# Patient Record
Sex: FEMALE | Race: WHITE | NOT HISPANIC OR LATINO | Employment: OTHER | URBAN - METROPOLITAN AREA
[De-identification: names, ages, dates, MRNs, and addresses within clinical notes are randomized per-mention and may not be internally consistent; named-entity substitution may affect disease eponyms.]

---

## 2023-02-05 PROBLEM — E55.9 VITAMIN D DEFICIENCY: Status: ACTIVE | Noted: 2023-02-05

## 2023-02-05 PROBLEM — I25.10 CORONARY ARTERY DISEASE INVOLVING NATIVE CORONARY ARTERY OF NATIVE HEART WITHOUT ANGINA PECTORIS: Status: ACTIVE | Noted: 2023-02-05

## 2023-02-05 PROBLEM — M19.90 ARTHRITIS: Status: ACTIVE | Noted: 2023-02-05

## 2023-02-05 PROBLEM — I65.29 STENOSIS OF CAROTID ARTERY: Status: ACTIVE | Noted: 2023-02-05

## 2023-02-05 PROBLEM — I10 HYPERTENSION, ESSENTIAL: Status: ACTIVE | Noted: 2023-02-05

## 2023-02-05 PROBLEM — E53.8 VITAMIN B12 DEFICIENCY: Status: ACTIVE | Noted: 2023-02-05

## 2023-02-05 PROBLEM — E78.2 HYPERLIPIDEMIA, MIXED: Status: ACTIVE | Noted: 2023-02-05

## 2023-02-05 PROBLEM — I51.9 DIASTOLIC DYSFUNCTION, LEFT VENTRICLE: Status: ACTIVE | Noted: 2023-02-05

## 2023-02-05 PROBLEM — R61 HYPERHIDROSIS: Status: ACTIVE | Noted: 2023-02-05

## 2023-02-05 PROBLEM — N20.0 KIDNEY STONES: Status: ACTIVE | Noted: 2023-02-05

## 2023-02-05 PROBLEM — M81.0 AGE-RELATED OSTEOPOROSIS WITHOUT CURRENT PATHOLOGICAL FRACTURE: Status: ACTIVE | Noted: 2023-02-05

## 2023-02-05 PROBLEM — F51.04 CHRONIC INSOMNIA: Status: ACTIVE | Noted: 2023-02-05

## 2023-02-05 PROBLEM — I77.819 DILATION OF AORTA (CMS-HCC): Status: ACTIVE | Noted: 2023-02-05

## 2023-02-05 PROBLEM — M50.90 CERVICAL DISC DISORDER: Status: ACTIVE | Noted: 2023-02-05

## 2023-02-05 PROBLEM — I51.7 LVH (LEFT VENTRICULAR HYPERTROPHY): Status: ACTIVE | Noted: 2023-02-05

## 2023-02-05 PROBLEM — F11.90 OPIOID USE: Status: ACTIVE | Noted: 2023-02-05

## 2023-02-05 RX ORDER — DENOSUMAB 60 MG/ML
60 INJECTION SUBCUTANEOUS
COMMUNITY
Start: 2018-08-17

## 2023-02-05 RX ORDER — ASPIRIN 325 MG
50000 TABLET, DELAYED RELEASE (ENTERIC COATED) ORAL
COMMUNITY
Start: 2021-07-15 | End: 2023-10-23

## 2023-02-05 RX ORDER — ZOLPIDEM TARTRATE 5 MG/1
5 TABLET ORAL NIGHTLY PRN
COMMUNITY
Start: 2018-08-17 | End: 2023-03-08 | Stop reason: SDUPTHER

## 2023-02-05 RX ORDER — HYDROCODONE BITARTRATE AND ACETAMINOPHEN 5; 325 MG/1; MG/1
1 TABLET ORAL EVERY 8 HOURS PRN
COMMUNITY
Start: 2018-08-17 | End: 2023-03-08 | Stop reason: SDUPTHER

## 2023-02-05 RX ORDER — ASPIRIN 325 MG
500 TABLET, DELAYED RELEASE (ENTERIC COATED) ORAL DAILY
COMMUNITY
Start: 2018-08-17

## 2023-02-05 RX ORDER — LANOLIN ALCOHOL/MO/W.PET/CERES
1000 CREAM (GRAM) TOPICAL DAILY
COMMUNITY
Start: 2019-10-31

## 2023-02-05 RX ORDER — AMLODIPINE AND BENAZEPRIL HYDROCHLORIDE 5; 40 MG/1; MG/1
1 CAPSULE ORAL DAILY
COMMUNITY
Start: 2018-08-17 | End: 2024-02-14

## 2023-02-05 RX ORDER — ASPIRIN 81 MG/1
81 TABLET ORAL DAILY
COMMUNITY
Start: 2018-08-17

## 2023-02-05 RX ORDER — ASCORBIC ACID 500 MG
500 TABLET,CHEWABLE ORAL 2 TIMES DAILY
COMMUNITY
Start: 2018-08-17

## 2023-02-05 RX ORDER — NALOXONE HYDROCHLORIDE 4 MG/.1ML
4 SPRAY NASAL AS NEEDED
COMMUNITY
Start: 2019-12-27

## 2023-02-05 RX ORDER — PHENOL 1.4 %
1 AEROSOL, SPRAY (ML) MUCOUS MEMBRANE DAILY
COMMUNITY
Start: 2018-08-17

## 2023-02-05 RX ORDER — ATORVASTATIN CALCIUM 80 MG/1
80 TABLET, FILM COATED ORAL DAILY
COMMUNITY
Start: 2018-11-15 | End: 2023-06-23 | Stop reason: SDUPTHER

## 2023-02-05 RX ORDER — NITROGLYCERIN 0.4 MG/1
0.4 TABLET SUBLINGUAL EVERY 5 MIN PRN
COMMUNITY
Start: 2022-05-26

## 2023-03-07 NOTE — TELEPHONE ENCOUNTER
Pt called for refill hydrocodone   Per allscripts we sent this in on 2/28/23    Relayed to patient   Verbally understands.   She will call pharmacy

## 2023-03-08 DIAGNOSIS — F51.04 CHRONIC INSOMNIA: ICD-10-CM

## 2023-03-08 DIAGNOSIS — M19.90 ARTHRITIS: ICD-10-CM

## 2023-03-08 RX ORDER — HYDROCODONE BITARTRATE AND ACETAMINOPHEN 5; 325 MG/1; MG/1
1 TABLET ORAL EVERY 8 HOURS PRN
Qty: 20 TABLET | Refills: 0 | Status: SHIPPED | OUTPATIENT
Start: 2023-03-08 | End: 2023-03-20 | Stop reason: SDUPTHER

## 2023-03-08 RX ORDER — ZOLPIDEM TARTRATE 5 MG/1
5 TABLET ORAL NIGHTLY PRN
Qty: 90 TABLET | Refills: 0 | Status: SHIPPED | OUTPATIENT
Start: 2023-03-08 | End: 2023-06-06 | Stop reason: SDUPTHER

## 2023-03-08 NOTE — TELEPHONE ENCOUNTER
Patient calling needs zolpidem to go to [facility] and hydrocodone to go to [facility] as [facility] is out of med that was sent on 2/28  Formatted both

## 2023-03-15 ENCOUNTER — APPOINTMENT (OUTPATIENT)
Dept: PRIMARY CARE | Facility: CLINIC | Age: 78
End: 2023-03-15
Payer: MEDICARE

## 2023-03-20 DIAGNOSIS — M19.90 ARTHRITIS: ICD-10-CM

## 2023-03-20 RX ORDER — HYDROCODONE BITARTRATE AND ACETAMINOPHEN 5; 325 MG/1; MG/1
1 TABLET ORAL EVERY 8 HOURS PRN
Qty: 20 TABLET | Refills: 0 | Status: SHIPPED | OUTPATIENT
Start: 2023-03-20 | End: 2023-04-07 | Stop reason: SDUPTHER

## 2023-04-03 DIAGNOSIS — M19.90 ARTHRITIS: ICD-10-CM

## 2023-04-03 RX ORDER — HYDROCODONE BITARTRATE AND ACETAMINOPHEN 5; 325 MG/1; MG/1
1 TABLET ORAL EVERY 8 HOURS PRN
Qty: 20 TABLET | Refills: 0 | Status: CANCELLED | OUTPATIENT
Start: 2023-04-03

## 2023-04-03 NOTE — TELEPHONE ENCOUNTER
Pt needs a CSV per SF  Pt cancelled last one so her medication can not be refilled  Please help schedule VV per SF  [phone]

## 2023-04-03 NOTE — TELEPHONE ENCOUNTER
Patient was in the hospital and needs help with VV's so has to wait until Friday to get help.  Appt scheduled.

## 2023-04-07 ENCOUNTER — OFFICE VISIT (OUTPATIENT)
Dept: PRIMARY CARE | Facility: CLINIC | Age: 78
End: 2023-04-07
Payer: MEDICARE

## 2023-04-07 VITALS
WEIGHT: 142.6 LBS | HEART RATE: 83 BPM | DIASTOLIC BLOOD PRESSURE: 64 MMHG | BODY MASS INDEX: 25.26 KG/M2 | OXYGEN SATURATION: 96 % | SYSTOLIC BLOOD PRESSURE: 136 MMHG

## 2023-04-07 DIAGNOSIS — F51.04 CHRONIC INSOMNIA: ICD-10-CM

## 2023-04-07 DIAGNOSIS — M19.90 ARTHRITIS: Primary | ICD-10-CM

## 2023-04-07 DIAGNOSIS — H61.22 IMPACTED CERUMEN, LEFT EAR: ICD-10-CM

## 2023-04-07 DIAGNOSIS — Z79.899 MEDICATION MANAGEMENT: ICD-10-CM

## 2023-04-07 DIAGNOSIS — M50.90 CERVICAL DISC DISORDER: ICD-10-CM

## 2023-04-07 DIAGNOSIS — F11.90 OPIOID USE: ICD-10-CM

## 2023-04-07 DIAGNOSIS — R09.82 POST-NASAL DRIP: ICD-10-CM

## 2023-04-07 PROCEDURE — 3078F DIAST BP <80 MM HG: CPT | Performed by: NURSE PRACTITIONER

## 2023-04-07 PROCEDURE — 1159F MED LIST DOCD IN RCRD: CPT | Performed by: NURSE PRACTITIONER

## 2023-04-07 PROCEDURE — 3075F SYST BP GE 130 - 139MM HG: CPT | Performed by: NURSE PRACTITIONER

## 2023-04-07 PROCEDURE — 1036F TOBACCO NON-USER: CPT | Performed by: NURSE PRACTITIONER

## 2023-04-07 PROCEDURE — 99214 OFFICE O/P EST MOD 30 MIN: CPT | Performed by: NURSE PRACTITIONER

## 2023-04-07 RX ORDER — FLUTICASONE PROPIONATE 50 MCG
1 SPRAY, SUSPENSION (ML) NASAL 2 TIMES DAILY
Qty: 16 G | Refills: 5 | Status: SHIPPED | OUTPATIENT
Start: 2023-04-07 | End: 2024-04-06

## 2023-04-07 RX ORDER — HYDROCODONE BITARTRATE AND ACETAMINOPHEN 5; 325 MG/1; MG/1
1 TABLET ORAL EVERY 8 HOURS PRN
Qty: 20 TABLET | Refills: 0 | Status: SHIPPED | OUTPATIENT
Start: 2023-04-07 | End: 2023-04-18 | Stop reason: SDUPTHER

## 2023-04-07 NOTE — PROGRESS NOTES
Problem List Items Addressed This Visit       Arthritis - Primary     Has cut down on opioid use  Cont current tx plan         Relevant Medications    HYDROcodone-acetaminophen (Norco) 5-325 mg tablet    Cervical disc disorder     Cont current tx plan          Chronic insomnia     Cont current dose ambien prn          Medication management     CSA, UDS, OARRS, CSV [facility] compliant            Opioid use     Narcan available           Other Visit Diagnoses       Impacted cerumen, left ear        try OTC debrox with prn fu    Relevant Medications    carbamide peroxide (Debrox) 6.5 % otic solution    Post-nasal drip        try OTC flonase    Relevant Medications    fluticasone (Flonase) 50 mcg/actuation nasal spray            Controlled Substance Visit:  I have personally reviewed the OARRS report and have considered the risks of abuse, dependence, addiction and diversion and I believe that it is clinically appropriate for the patient to be prescribed this medication.    Is the patient prescribed a combination of a benzodiazepine and opioid?  No    Last Urine Drug Screen / ordered today: No  Recent Results (from the past 21059 hour(s))   OPIATE/OPIOID/BENZO PRESCRIPTION COMPLIANCE    Collection Time: 01/04/23  8:53 AM   Result Value Ref Range    DRUG SCREEN COMMENT URINE SEE BELOW     Creatine, Urine 75.0 mg/dL    Amphetamine Screen, Urine PRESUMPTIVE NEGATIVE NEGATIVE    Barbiturate Screen, Urine PRESUMPTIVE NEGATIVE NEGATIVE    Cannabinoid Screen, Urine PRESUMPTIVE NEGATIVE NEGATIVE    Cocaine Screen, Urine PRESUMPTIVE NEGATIVE NEGATIVE    PCP Screen, Urine PRESUMPTIVE NEGATIVE NEGATIVE    7-Aminoclonazepam <25 Cutoff <25 ng/mL    Alpha-Hydroxyalprazolam <25 Cutoff <25 ng/mL    Alpha-Hydroxymidazolam <25 Cutoff <25 ng/mL    Alprazolam <25 Cutoff <25 ng/mL    Chlordiazepoxide <25 Cutoff <25 ng/mL    Clonazepam <25 Cutoff <25 ng/mL    Diazepam <25 Cutoff <25 ng/mL    Lorazepam <25 Cutoff <25 ng/mL    Midazolam <25 Cutoff  <25 ng/mL    Nordiazepam <25 Cutoff <25 ng/mL    Oxazepam <25 Cutoff <25 ng/mL    Temazepam <25 Cutoff <25 ng/mL    Zolpidem <25 Cutoff <25 ng/mL    Zolpidem Metabolite (ZCA) >1000 (A) Cutoff <25 ng/mL    6-Acetylmorphine <25 Cutoff <25 ng/mL    Codeine <50 Cutoff <50 ng/mL    Hydrocodone 121 (A) Cutoff <25 ng/mL    Hydromorphone 126 (A) Cutoff <25 ng/mL    Morphine Urine <50 Cutoff <50 ng/mL    Norhydrocodone 501 (A) Cutoff <25 ng/mL    Noroxycodone <25 Cutoff <25 ng/mL    Oxycodone <25 Cutoff <25 ng/mL    Oxymorphone <25 Cutoff <25 ng/mL    Tramadol <50 Cutoff <50 ng/mL    O-Desmethyltramadol <50 Cutoff <50 ng/mL    Fentanyl <2.5 Cutoff<2.5 ng/mL    Norfentanyl <2.5 Cutoff<2.5 ng/mL    METHADONE CONFIRMATION,URINE <25 Cutoff <25 ng/mL    EDDP <25 Cutoff <25 ng/mL     Results are as expected.     Controlled Substance Agreement:  Date of the Last Agreement: 1/4/23  I have reviewed each line item on the Controlled Substance Agreement including, but not limited to, the benefits, risks, and alternatives to treatment with a Controlled Substance medication(s). The patient has verbalized understanding and signed the agreement.    Opioids:  What is the patient's goal of therapy? pain  Is this being achieved with current treatment? yes    I have calculated the patient's Morphine Dose Equivalent (MED):   I have considered referral to Pain Management and/or a specialist, and do not feel it is necessary at this time.    I feel that it is clinically indicated to continue this current medication regimen after consideration of alternative therapies, and other non-opioid treatment.    Opioid Risk Screening:  No data recorded    Pain Assessment:  Analgesia  What was your pain level on average during the past week?: 8  What was your pain level at its worst during the past week?: 10 - Pain as bad as it can be  What percentage of your pain has been relieved during the past week?: 50 %  Is the amount of pain relief you are now  obtaining from your current pain relievers enough to make a real difference in your life?: Y    Activities of Daily Living  Physical Functioning: Same  Family Relationships: Same  Social Relationships: Same  Mood: Same  Sleep Patterns: Same  Overall Functioning: Same    Adverse Events  Is patient experiencing any side effects from current pain relievers?: N     and Sleep Aids:   What is the patient's goal of therapy? insomnia  Is this being achieved with current treatment? yes    Activities of Daily Living:   Is your overall impression that this patient is benefiting (symptom reduction outweighs side effects) from sleep aid therapy? Yes     1. Physical Functioning: Same  2. Family Relationship: Same  3. Social Relationship: Same  4. Mood: Same  5. Sleep Patterns: Same  6. Overall Function: Same      Gen: Alert, NAD  HEENT:  PERRLA, EOMI, conjunctiva and sclera normal in appearance; Neck supple  Respiratory:  Lungs CTAB  Cardiovascular:  Heart RRR. No M/R/G  Abdomen: soft  Neuro:  Gross motor and sensory intact  Skin:  No suspicious lesions present   Mood: normal

## 2023-04-18 DIAGNOSIS — M19.90 ARTHRITIS: ICD-10-CM

## 2023-04-19 RX ORDER — HYDROCODONE BITARTRATE AND ACETAMINOPHEN 5; 325 MG/1; MG/1
1 TABLET ORAL EVERY 8 HOURS PRN
Qty: 90 TABLET | Refills: 0 | Status: SHIPPED | OUTPATIENT
Start: 2023-04-19 | End: 2023-05-18 | Stop reason: SDUPTHER

## 2023-04-20 DIAGNOSIS — M19.90 ARTHRITIS: ICD-10-CM

## 2023-04-20 RX ORDER — HYDROCODONE BITARTRATE AND ACETAMINOPHEN 5; 325 MG/1; MG/1
1 TABLET ORAL EVERY 8 HOURS PRN
Qty: 90 TABLET | Refills: 0 | Status: CANCELLED | OUTPATIENT
Start: 2023-04-20 | End: 2023-05-20

## 2023-05-02 DIAGNOSIS — E55.9 VITAMIN D DEFICIENCY: ICD-10-CM

## 2023-05-02 DIAGNOSIS — Z00.00 ANNUAL PHYSICAL EXAM: ICD-10-CM

## 2023-05-02 DIAGNOSIS — E53.8 VITAMIN B12 DEFICIENCY: ICD-10-CM

## 2023-05-02 DIAGNOSIS — E78.2 HYPERLIPIDEMIA, MIXED: ICD-10-CM

## 2023-05-02 DIAGNOSIS — Z01.89 ROUTINE LAB DRAW: ICD-10-CM

## 2023-05-02 DIAGNOSIS — Z00.00 ROUTINE GENERAL MEDICAL EXAMINATION AT A HEALTH CARE FACILITY: ICD-10-CM

## 2023-05-15 ENCOUNTER — TELEPHONE (OUTPATIENT)
Dept: PRIMARY CARE | Facility: CLINIC | Age: 78
End: 2023-05-15
Payer: MEDICARE

## 2023-05-18 DIAGNOSIS — M19.90 ARTHRITIS: ICD-10-CM

## 2023-05-18 RX ORDER — HYDROCODONE BITARTRATE AND ACETAMINOPHEN 5; 325 MG/1; MG/1
1 TABLET ORAL EVERY 8 HOURS PRN
Qty: 90 TABLET | Refills: 0 | Status: SHIPPED | OUTPATIENT
Start: 2023-05-18 | End: 2023-06-22 | Stop reason: SDUPTHER

## 2023-05-30 PROBLEM — Z00.00 ROUTINE ADULT HEALTH MAINTENANCE: Status: ACTIVE | Noted: 2023-05-30

## 2023-05-30 PROBLEM — Z71.89 CARDIAC RISK COUNSELING: Status: ACTIVE | Noted: 2023-05-30

## 2023-05-30 PROBLEM — M89.9 DISORDER OF BONE AND CARTILAGE: Status: RESOLVED | Noted: 2023-05-30 | Resolved: 2023-05-30

## 2023-05-30 PROBLEM — Z71.89 ADVANCED DIRECTIVES, COUNSELING/DISCUSSION: Status: ACTIVE | Noted: 2023-05-30

## 2023-05-30 PROBLEM — M81.0 OSTEOPOROSIS: Status: ACTIVE | Noted: 2023-05-30

## 2023-05-30 PROBLEM — M94.9 DISORDER OF BONE AND CARTILAGE: Status: ACTIVE | Noted: 2023-05-30

## 2023-05-30 PROBLEM — M94.9 DISORDER OF BONE AND CARTILAGE: Status: RESOLVED | Noted: 2023-05-30 | Resolved: 2023-05-30

## 2023-05-30 PROBLEM — R73.01 IMPAIRED FASTING GLUCOSE: Status: ACTIVE | Noted: 2023-05-30

## 2023-05-30 PROBLEM — M89.9 DISORDER OF BONE AND CARTILAGE: Status: ACTIVE | Noted: 2023-05-30

## 2023-05-30 PROBLEM — Z13.89 SCREENING FOR MULTIPLE CONDITIONS: Status: ACTIVE | Noted: 2023-05-30

## 2023-05-30 PROBLEM — R00.2 PALPITATIONS: Status: ACTIVE | Noted: 2023-05-30

## 2023-05-30 PROBLEM — M47.816 LUMBAR SPONDYLOSIS: Status: ACTIVE | Noted: 2023-04-24

## 2023-05-30 PROBLEM — D64.9 ANEMIA: Status: ACTIVE | Noted: 2023-05-30

## 2023-06-06 DIAGNOSIS — F51.04 CHRONIC INSOMNIA: ICD-10-CM

## 2023-06-06 RX ORDER — ZOLPIDEM TARTRATE 5 MG/1
5 TABLET ORAL NIGHTLY PRN
Qty: 90 TABLET | Refills: 0 | Status: SHIPPED | OUTPATIENT
Start: 2023-06-06 | End: 2023-09-11 | Stop reason: SDUPTHER

## 2023-06-22 DIAGNOSIS — M19.90 ARTHRITIS: ICD-10-CM

## 2023-06-22 RX ORDER — HYDROCODONE BITARTRATE AND ACETAMINOPHEN 5; 325 MG/1; MG/1
1 TABLET ORAL EVERY 8 HOURS PRN
Qty: 90 TABLET | Refills: 0 | Status: SHIPPED | OUTPATIENT
Start: 2023-06-22 | End: 2023-07-22

## 2023-06-23 ENCOUNTER — OFFICE VISIT (OUTPATIENT)
Dept: PRIMARY CARE | Facility: CLINIC | Age: 78
End: 2023-06-23
Payer: MEDICARE

## 2023-06-23 VITALS
HEIGHT: 64 IN | DIASTOLIC BLOOD PRESSURE: 76 MMHG | SYSTOLIC BLOOD PRESSURE: 130 MMHG | OXYGEN SATURATION: 98 % | BODY MASS INDEX: 24.07 KG/M2 | WEIGHT: 141 LBS | TEMPERATURE: 98 F | HEART RATE: 57 BPM

## 2023-06-23 DIAGNOSIS — E55.9 VITAMIN D DEFICIENCY: ICD-10-CM

## 2023-06-23 DIAGNOSIS — Z71.89 ADVANCED DIRECTIVES, COUNSELING/DISCUSSION: ICD-10-CM

## 2023-06-23 DIAGNOSIS — E78.2 MIXED HYPERLIPIDEMIA: ICD-10-CM

## 2023-06-23 DIAGNOSIS — I10 ESSENTIAL HYPERTENSION: ICD-10-CM

## 2023-06-23 DIAGNOSIS — Z00.00 ROUTINE ADULT HEALTH MAINTENANCE: Primary | ICD-10-CM

## 2023-06-23 DIAGNOSIS — M81.0 OSTEOPOROSIS WITHOUT CURRENT PATHOLOGICAL FRACTURE, UNSPECIFIED OSTEOPOROSIS TYPE: ICD-10-CM

## 2023-06-23 DIAGNOSIS — Z79.899 MEDICATION MANAGEMENT: ICD-10-CM

## 2023-06-23 DIAGNOSIS — F51.04 CHRONIC INSOMNIA: ICD-10-CM

## 2023-06-23 DIAGNOSIS — I77.819 DILATION OF AORTA (CMS-HCC): ICD-10-CM

## 2023-06-23 DIAGNOSIS — Z13.89 SCREENING FOR MULTIPLE CONDITIONS: ICD-10-CM

## 2023-06-23 DIAGNOSIS — H61.23 BILATERAL IMPACTED CERUMEN: ICD-10-CM

## 2023-06-23 DIAGNOSIS — Z71.89 CARDIAC RISK COUNSELING: ICD-10-CM

## 2023-06-23 DIAGNOSIS — Z12.31 ENCOUNTER FOR SCREENING MAMMOGRAM FOR BREAST CANCER: ICD-10-CM

## 2023-06-23 DIAGNOSIS — E53.8 VITAMIN B12 DEFICIENCY: ICD-10-CM

## 2023-06-23 DIAGNOSIS — Z12.11 SCREEN FOR COLON CANCER: ICD-10-CM

## 2023-06-23 DIAGNOSIS — M81.0 AGE-RELATED OSTEOPOROSIS WITHOUT CURRENT PATHOLOGICAL FRACTURE: ICD-10-CM

## 2023-06-23 DIAGNOSIS — R41.9 COGNITIVE COMPLAINTS: ICD-10-CM

## 2023-06-23 PROBLEM — D64.9 ANEMIA: Status: RESOLVED | Noted: 2023-05-30 | Resolved: 2023-06-23

## 2023-06-23 PROBLEM — R00.2 PALPITATIONS: Status: RESOLVED | Noted: 2023-05-30 | Resolved: 2023-06-23

## 2023-06-23 PROBLEM — R73.01 IMPAIRED FASTING GLUCOSE: Status: RESOLVED | Noted: 2023-05-30 | Resolved: 2023-06-23

## 2023-06-23 PROCEDURE — 3078F DIAST BP <80 MM HG: CPT | Performed by: INTERNAL MEDICINE

## 2023-06-23 PROCEDURE — 99397 PER PM REEVAL EST PAT 65+ YR: CPT | Performed by: INTERNAL MEDICINE

## 2023-06-23 PROCEDURE — 3075F SYST BP GE 130 - 139MM HG: CPT | Performed by: INTERNAL MEDICINE

## 2023-06-23 PROCEDURE — 99214 OFFICE O/P EST MOD 30 MIN: CPT | Performed by: INTERNAL MEDICINE

## 2023-06-23 PROCEDURE — G0439 PPPS, SUBSEQ VISIT: HCPCS | Performed by: INTERNAL MEDICINE

## 2023-06-23 PROCEDURE — 1036F TOBACCO NON-USER: CPT | Performed by: INTERNAL MEDICINE

## 2023-06-23 PROCEDURE — 99497 ADVNCD CARE PLAN 30 MIN: CPT | Performed by: INTERNAL MEDICINE

## 2023-06-23 PROCEDURE — 1159F MED LIST DOCD IN RCRD: CPT | Performed by: INTERNAL MEDICINE

## 2023-06-23 PROCEDURE — 96372 THER/PROPH/DIAG INJ SC/IM: CPT | Performed by: INTERNAL MEDICINE

## 2023-06-23 PROCEDURE — 1160F RVW MEDS BY RX/DR IN RCRD: CPT | Performed by: INTERNAL MEDICINE

## 2023-06-23 RX ORDER — ATORVASTATIN CALCIUM 80 MG/1
80 TABLET, FILM COATED ORAL DAILY
Qty: 90 TABLET | Refills: 3 | Status: SHIPPED | OUTPATIENT
Start: 2023-06-23 | End: 2023-09-13

## 2023-06-23 RX ORDER — AMOXICILLIN 500 MG/1
CAPSULE ORAL
COMMUNITY
Start: 2022-09-15

## 2023-06-23 ASSESSMENT — MINI MENTAL STATE EXAM
WHAT IS THE YEAR, SEASON, DATE, DAY, AND MONTH: 4 CORRECT
PLACE DESIGN, ERASER AND PENCIL IN FRONT OF THE PERSON.  SAY:  COPY THIS DESIGN PLEASE.  SHOW: DESIGN. ALLOW: MULTIPLE TRIES. WAIT UNTIL PERSON IS FINISHED AND HANDS IT BACK. SCORE: ONLY FOR DIAGRAM WITH 4-SIDED FIGURE BETWEEN TWO 5-SIDED FIGURES: 1 CORRECT
WHAT STATE, COUNTRY, CITY, HOSPITAL, FLOOR: 5 CORRECT
SAY: I WOULD LIKE YOU TO REPEAT THIS PHRASE AFTER ME: NO IFS, ANDS, OR BUTS.: 1 CORRECT
SAY:  READ THE WORDS ON THE PAGE AND THEN DO WHAT IT SAYS.  THEN HAND THE PERSON THE SHEET WITH CLOSE YOUR EYES ON IT.  IF THE SUBJECT READS AND DOES NOT CLOSE THEIR EYES, REPEAT UP TO THREE TIMES.  SCORE ONLY IF SUBJECT CLOSES EYES.: 3 CORRECT
SUM ALL MMSE QUESTIONS FOR TOTAL SCORE [OUT OF 30].: 28
NAME OR REPEAT 3 OBJECTS - (APPLE, TABLE, PENNY) OR (BALL, TREE, FLAG): 3 CORRECT
SPELL THE WORD WORLD FORWARD AND BACKWARDS OR SERIAL 7S: 5 CORRECT
RECALL THE 3 OBJECTS FROM ABOVE (APPLE, TABLE, PENNY) OR (BALL, TREE, FLAG): 2 CORRECT
PLEASE COPY THIS PICTURE (NOTE ALL 10 ANGLES MUST BE PRESENT AND TWO MUST INTERSECT): 1 CORRECT
HAND THE PERSON A PENCIL AND PAPER. SAY:  WRITE ANY COMPLETE SENTENCE ON THAT PIECE OF PAPER. (NOTE: THE SENTENCE MUST MAKE SENSE.  IGNORE SPELLING ERRORS): 1 CORRECT
SHOW: PENCIL [OBJECT] ASK: WHAT IS THIS CALLED?: 2 CORRECT

## 2023-06-23 ASSESSMENT — PATIENT HEALTH QUESTIONNAIRE - PHQ9
2. FEELING DOWN, DEPRESSED OR HOPELESS: NOT AT ALL
1. LITTLE INTEREST OR PLEASURE IN DOING THINGS: NOT AT ALL
SUM OF ALL RESPONSES TO PHQ9 QUESTIONS 1 AND 2: 0

## 2023-06-23 ASSESSMENT — LIFESTYLE VARIABLES: TOTAL SCORE: 0

## 2023-06-23 NOTE — PROGRESS NOTES
Assessment and Plan:  Problem List Items Addressed This Visit          High    Routine adult health maintenance - Primary    Overview     Influenza Seasonal - High Dose - Age 65+ 10/1/19, 9/5/20, 10/1/21, 10/13/22  Moderna COVID 19 vaccine 3/17/21, 4/14/21, 11/7/21, 7/18/22    Pneumococcal-13 Vac Conjugate 5/5/2015   Pneumovax 8/18/2009, 6/23/20   Tdap (Age 7+)3/24/2006   Tetanus Diphtheria Booster (Age >7) Pres Free 5/9/2016   Vczohbbx89/30/2008  Shingrix 9/25/20, 12/1/20  Cologuard 3/5/18 (-); 3/2021 (-)  BMD 6/16; 7/20, 4/28/22  Mamm 12/17, 2/3/20, 3/12/21, 6/1/23 6/30/22: Current 10-Year ASCVD Risk:  32.58% - High Risk         Current Assessment & Plan     Annual Wellness exam completed   Preventive Health history reviewed:  Vaccines today: none  Labs ordered    Mammogram ordered  Cologuard ordered for 3/24  Depression Screening done  Advanced Directives Discussion Completed  Cardiovascular risk discussed and if needed, lifestyle modifications recommended, including nutritional choices, exercise, and elimination of habits contributing to risk.  We agreed on a plan to reduce the current cardiovascular risk.  See ecalc ASCVD Risk  Plus for data discussed regarding risk and risk reduction opportunities.  Aspirin use/disuse was discussed after reviewing the updated guidelines.               Medium    Advanced directives, counseling/discussion    Overview     6/23/23: Son Amish Luong is her HCPOA  If QOL isnt going to be poor wants to be DNR/Arrest         Age-related osteoporosis without current pathological fracture    Overview     Prolia started 10/12  BMD 4/28/22: T score -5.0 (forearm radius)         Current Assessment & Plan     Prolia today         Cardiac risk counseling    Overview     6/30/22: Current 10-Year ASCVD Risk:  32.58% - High Risk    on ASA (CAD)         Chronic insomnia    Overview     Managed with Ambien  Tolerates well, no AE         Cognitive complaints    Overview     6/23: MSME  28/30  Will monitor         Relevant Orders    Cognitive evaluation (Completed)    Dilation of aorta (CMS/HCC)    Overview     ECHO 5/22: Aorta dilated at 4.0cm at Sinus   sees [facility]  Monitor         Encounter for screening mammogram for breast cancer    Relevant Orders    BI mammo bilateral screening tomosynthesis    Essential hypertension    Overview     Goal <130/80  controlled with CCB/ACEi  9/27/22 Cuff Comparison  Home cuff - 155/73 HR 63  Office cuff - 152/72 HR 61  Manual cuff - 152/76;         Relevant Orders    Urinalysis with Reflex Microscopic    TSH with reflex to Free T4 if abnormal    Comprehensive Metabolic Panel    CBC and Auto Differential    Lipid Panel    Medication management    Overview     CSA, UDS, OARRS, CSV [facility] compliant            Mixed hyperlipidemia    Overview     Goal LDL <100.  On statin & fish oil         Current Assessment & Plan     Had not been taking statin  resume         Relevant Medications    atorvastatin (Lipitor) 80 mg tablet    Other Relevant Orders    TSH with reflex to Free T4 if abnormal    Lipid Panel    RESOLVED: Osteoporosis    Relevant Medications    denosumab (Prolia) injection 60 mg (Completed)    Screen for colon cancer    Relevant Orders    Cologuard® colon cancer screening    Screening for multiple conditions    Overview     Depression screening completed           Vitamin B12 deficiency    Overview     0/19 level was 260  12/2020 = 406  on PO supp daily         Relevant Orders    Vitamin B12    Vitamin D deficiency    Overview     Comment on above: 12/2020 = 32.4Goal 40-50On 5K daily supplement;  12/2020 = 32.4, 6/21: 34Goal 40-50On 5K daily supplement;         Relevant Orders    Vitamin D, Total     Other Visit Diagnoses       Bilateral impacted cerumen        Relevant Orders    Referral to ENT          Chief Complaint:   Medicare Wellness Exam/Comprehensive Problem Focused Follow Up and Physical Exam    HPI: BP at home is high  She is seeing Ortho  for her back and hip pain    She has wax in her ears would like to see ENT    She is noticing her memory is fading    Lasb reviewed from 8/22    Saw Cardiologist [facility] (copied):  ASSESSMENT/PLAN:  1. Mixed hyperlipidemia - ICD9: 272.2, ICD10: E78.2 (primary diagnosis)  Will get lipids to make sure LDL below 70 at least  Will get labs with Thea Bach MD  - ECG COMPLETE    2. Chronic diastolic heart failure (HCC) - ICD9: 428.32, ICD10: I50.32  Asymptomatic  - ECG COMPLETE    3. Coronary artery disease of native artery of native heart with stable angina pectoris (HCC) - ICD9: 414.01, 413.9, ICD10: I25.118  S/p PCI LAD [ heavily calcified ] 2011  So far no evidence of ISR  - ECG COMPLETE  - LIPID PANEL BASIC      Patient Care Team:  Thea Bach MD as PCP - General   Cardiology: Dr Esquivel  Ortho: ROSARIO WALLACE M.D.     Active Problem List  Patient Active Problem List   Diagnosis    Age-related osteoporosis without current pathological fracture    Arthritis    Cervical disc disorder    Chronic insomnia    Atherosclerosis of coronary artery    Diastolic dysfunction, left ventricle    Dilation of aorta (CMS/HCC)    Hyperhidrosis    Mixed hyperlipidemia    Essential hypertension    Kidney stones    Left ventricular hypertrophy    Opioid use    Stenosis of carotid artery    Vitamin B12 deficiency    Vitamin D deficiency    Medication management    Routine adult health maintenance    Advanced directives, counseling/discussion    Screening for multiple conditions    Cardiac risk counseling    Lumbar spondylosis    Symptomatic menopausal or female climacteric states    Screen for colon cancer    Encounter for screening mammogram for breast cancer    Cognitive complaints         Comprehensive Medical/Surgical/Social/Family History  Past Medical History:   Diagnosis Date    H/O bone density study     4/28/22: T score -5.0 (forearm radius), spine normal 6/16: osteopenia; -3.5; -4.0 7/20 -3.4 forearm    H/O cardiovascular stress  test     6/22: CONCLUSIONS:  1. SPECT Perfusion Study: Normal.  2. There is no scintigraphic evidence for inducible ischemia.  3. No evidence of scarred myocardium.  4. Left ventricle is normal in size. The left ventricle systolic  function is normal.  5. Right ventricle is normal in size. The right ventricle systolic  function is normal.  6. This is a low risk scan.   LVEF % 69 -71    H/O cervical spine x-ray     2007; Narrowing of the C4-C5, C5-C6, and C6-C7 disc spaces    H/O CT scan of abdomen     2006: Left UPJ or proximal ureteral calculus with associated mild hydronephrosis, punctate calculus right and left kidney; 3.6mm stone distal left ureter at UV junction, stone in right kidney unchanged    H/O CT scan of brain     2006 (-)    H/O Doppler ultrasound     11/16: Mild bilateral carotid bifurcation occlusive disease; 20-39% stenosis. 3/19: Compared to prior study of 6/14/2016, No significant change.    H/O echocardiogram     5/22: There is mild upper septal left  ventricular hypertrophy. Left ventricular systolic function is borderline normal.  EF = 54  5% (2D 4-ch.) Grade I left ventricular diastolic dysfunction.  - Basal inferior/inferoseptal severe hypokinesis/akinesis.  - The right ventricle is normal in size. Right ventricular systolic function is  normal.  - The left atrial cavity is moderately dilated.    H/O echocardiogram     (cont) - Aorta dilated at 4.0cm at Sinus (remainder not well visualized).  - Mild mitral (trivial-1+), tricuspid (trace), and pulmonic (trace-1+)  regurgitation.  - Mild (1+) aortic regurgitation.  - Estimated low/normal left and right atrial filling pressures.  6/15: Septal hypertrophy, Mild tricuspid regurgitation, normal PASP, DD; mild LVH, regional abnormalities in the RCA territory; LAE    H/O electrocardiogram     2007: NSR, frequent PVCs, rare PACs    H/O magnetic resonance imaging of brain and brain stem     2005 (-)    H/O magnetic resonance imaging of cervical spine      3/22: Multilevel cervical spondylosis with bulging disc, greatest at C4-5. There is mass effect upon the ventral surface of the spinal cord at C4-5 asymmetric to the left. There is bilateral multilevel neural foraminal narrowing of the cervical spine which is more pronounced at C3-4 on the right, C4-5 and C5-6 on the left, and C6-7 bilaterally.    History of Holter monitoring     2007: NSR, frequent PVCs, rare PACs     Past Surgical History:   Procedure Laterality Date    CARDIAC CATHETERIZATION  08/15/2018    9/11: LV function is normal with severe hypokinesis of inferior wall, LAD stents x2. Chronically occluded RCA that fills via left-to-right collaterals. Occluded major diagonal, which also fills via left-to-left collaterals.    CHOLECYSTECTOMY  08/15/2018    Cholecystectomy    INCISIONAL BREAST BIOPSY  08/15/2018    Incisional Breast Biopsy    OTHER SURGICAL HISTORY  08/15/2018    Continuous ECG Holter Monitor 24 Hour    OTHER SURGICAL HISTORY  08/15/2018    Hammertoe Operation (Each Toe)    TOTAL HIP ARTHROPLASTY  08/15/2018    Hip Replacement     Social History     Social History Narrative    , living with son    Retired    Nonsmoker    Social ETOH    ---    Family History:    pt adopted    Granddaughter: Neuropsychologist     Tobacco/Alcohol/Opioid use, as well as Illicit Drug Use was screened for/reviewed and documented in Social Documentation section of the chart and medication list as appropriate    Allergies and Medications  Carbamazepine, Morphine, Propoxyphene, and Niacin  Current Outpatient Medications   Medication Instructions    amLODIPine-benazepriL (Lotrel) 5-40 mg capsule 1 capsule, oral, Daily    amoxicillin (Amoxil) 500 mg capsule 4 CAPS (1) HOUR PRIOR TO APPT    ascorbic acid (VITAMIN C) 500 mg, oral, 2 times daily    aspirin 81 mg, oral, Daily    atorvastatin (LIPITOR) 80 mg, oral, Daily    cholecalciferol (VITAMIN D-3) 50,000 Units, oral, Weekly    cyanocobalamin (VITAMIN B-12)  "1,000 mcg, oral, Daily, AS DIRECTED    fluticasone (Flonase) 50 mcg/actuation nasal spray 1 spray, Each Nostril, 2 times daily, Shake gently. Before first use, prime pump. After use, clean tip and replace cap.    HYDROcodone-acetaminophen (Norco) 5-325 mg tablet 1 tablet, oral, Every 8 hours PRN    multivitamin-min-iron-FA-vit K (Adults Multivitamin) 18 mg iron-400 mcg-25 mcg tablet 1 tablet, oral, Daily    naloxone (NARCAN) 4 mg, nasal, As needed, 4 mg (contents of 1 nasal spray) as a single dose in one nostril; may repeat every 2 to 3 minutes in alternating nostrils    nitroglycerin (NITROSTAT) 0.4 mg, sublingual, Every 5 min PRN    omega-3 (Fish Oil) 60- mg capsule 500 mg, oral, Daily    Prolia 60 mg, subcutaneous, Every 6 months    zolpidem (AMBIEN) 5 mg, oral, Nightly PRN     Medications and Supplements  prescribed by me and other practitioners or clinical pharmacist (such as prescriptions, OTC's, herbal therapies and supplements) were reviewed and documented in the medical record.      Activities of Daily Living  In your present state of health, do you have any difficulty performing the following activities?:   Preparing food and eating?: No  Bathing yourself: No  Getting dressed: No  Using the toilet:No  Moving around from place to place: Yes  In the past year have you fallen or had a near fall?:No  Able to manage finances independently: Yes  Able to perform grocery shopping: Yes  Able to manage medications independently: Yes  Able to do housework independently: Yes  Patient self-assessment of health status? Good    Depression Screen  (Note: if answer to either of the following is \"Yes\", then a more complete depression screening is indicated)   Q1: Over the past two weeks, have you felt down, depressed or hopeless? No  Q2: Over the past two weeks, have you felt little interest or pleasure in doing things? No    Current exercise habits: PT    Dietary issues discussed: Yes  Hearing difficulties: No  Safe " "in current home environment: Yes  Visual Acuity assessed: Yes  Cognitive Impairment No    Advance directives  Advanced Care Planning (including a Living Will, Healthcare POA, as well as specific end of life choices and/or directives), was discussed for approximately 16 minutes with the patient and/or surrogate, voluntarily, and documented in the Problem List of the medical record.     Cardiac Risk Assessment  Cardiovascular risk was discussed and, if needed, lifestyle modifications recommended, including nutritional choices, exercise, and elimination of habits contributing to risk. We agreed on a plan to reduce the current cardiovascular risk based on above discussion as needed.  Aspirin use/disuse was discussed and documented in the Problem List of the medical record after reviewing the updated guidelines below:    Consider low dose Aspirin ( mg) use if the benefit for cardiovascular disease prevention outweighs risk for bleeding complications.   In general, low dose ASA should be considered:  In patients WITHOUT prior MI/stroke/PAD (primary prevention):   a. Age <60: Use if 10-year cardiovascular disease risk >20%, with discussion of risks and benefits with patient  b. Age 60-<70: Use if 10-year cardiovascular disease risk >20% and low bleeding (e.g., gastrointenstinal) risk, with discussion of risks and benefits with patient  c. Age >=70: Do not use    In patients WITH prior MI/stroke/PAD (secondary prevention):   Generally use unless extremely high bleeding (e.g., gastrointenstinal) risk, with discussion of risks and benefits with patient    ROS otherwise negative aside from what was mentioned above in HPI.    Vitals  /76   Pulse 57   Temp 36.7 °C (98 °F)   Ht 1.613 m (5' 3.5\")   Wt 64 kg (141 lb)   SpO2 98%   BMI 24.59 kg/m²   Body mass index is 24.59 kg/m².  Physical Exam  Gen: Alert, NAD  HEENT:  Unremarkable, loulou minimal cerumen in left ear  Neck:  No BROOKE  Respiratory:  Lungs " CTAB  Cardiovascular:  Heart RRR, trace LE edema  Neuro:  Gross motor and sensory intact  Skin:  No suspicious lesions present  Breast: No masses, or axillary lymphadenopathy    MMSE 28/30    During the course of the visit the patient was educated and counseled about age appropriate screening and preventive services. Completed preventive screenings were documented in the chart and orders were placed for outstanding screenings/procedures as documented in the Assessment and Plan.    Patient Instructions (the written plan) was given to the patient at check out.    Controlled Substance Visit:  I have personally reviewed the OARRS report and have considered the risks of abuse, dependence, addiction and diversion and I believe that it is clinically appropriate for the patient to be prescribed this(these) medication(s).    Is the patient prescribed a combination of a benzodiazepine and opioid? no    The last Urine Drug Screen has been reviewed and results are as expected,  and/or the UDS was ordered today if due.  Recent Results (from the past 83825 hour(s))   OPIATE/OPIOID/BENZO PRESCRIPTION COMPLIANCE    Collection Time: 01/04/23  8:53 AM   Result Value Ref Range    DRUG SCREEN COMMENT URINE SEE BELOW     Creatine, Urine 75.0 mg/dL    Amphetamine Screen, Urine PRESUMPTIVE NEGATIVE NEGATIVE    Barbiturate Screen, Urine PRESUMPTIVE NEGATIVE NEGATIVE    Cannabinoid Screen, Urine PRESUMPTIVE NEGATIVE NEGATIVE    Cocaine Screen, Urine PRESUMPTIVE NEGATIVE NEGATIVE    PCP Screen, Urine PRESUMPTIVE NEGATIVE NEGATIVE    7-Aminoclonazepam <25 Cutoff <25 ng/mL    Alpha-Hydroxyalprazolam <25 Cutoff <25 ng/mL    Alpha-Hydroxymidazolam <25 Cutoff <25 ng/mL    Alprazolam <25 Cutoff <25 ng/mL    Chlordiazepoxide <25 Cutoff <25 ng/mL    Clonazepam <25 Cutoff <25 ng/mL    Diazepam <25 Cutoff <25 ng/mL    Lorazepam <25 Cutoff <25 ng/mL    Midazolam <25 Cutoff <25 ng/mL    Nordiazepam <25 Cutoff <25 ng/mL    Oxazepam <25 Cutoff <25 ng/mL     Temazepam <25 Cutoff <25 ng/mL    Zolpidem <25 Cutoff <25 ng/mL    Zolpidem Metabolite (ZCA) >1000 (A) Cutoff <25 ng/mL    6-Acetylmorphine <25 Cutoff <25 ng/mL    Codeine <50 Cutoff <50 ng/mL    Hydrocodone 121 (A) Cutoff <25 ng/mL    Hydromorphone 126 (A) Cutoff <25 ng/mL    Morphine Urine <50 Cutoff <50 ng/mL    Norhydrocodone 501 (A) Cutoff <25 ng/mL    Noroxycodone <25 Cutoff <25 ng/mL    Oxycodone <25 Cutoff <25 ng/mL    Oxymorphone <25 Cutoff <25 ng/mL    Tramadol <50 Cutoff <50 ng/mL    O-Desmethyltramadol <50 Cutoff <50 ng/mL    Fentanyl <2.5 Cutoff<2.5 ng/mL    Norfentanyl <2.5 Cutoff<2.5 ng/mL    METHADONE CONFIRMATION,URINE <25 Cutoff <25 ng/mL    EDDP <25 Cutoff <25 ng/mL       Controlled Substance Agreement Date 01/04/2023  I have reviewed each line item on the Controlled Substance Agreement including, but not limited to, the benefits, risks, and alternatives to treatment with a Controlled Substance medication(s). The patient has verbalized understanding and signed the agreement.    Opioids and/or Lyrica:  What is the patient's goal of therapy? To help manage pain so I can move  Is this being achieved with current treatment? yes  I have calculated the patient's Morphine Dose Equivalent (MED):   I have considered referral to Pain Management and/or a specialist, and do not feel it is necessary at this time.  I feel that it is clinically indicated to continue this current medication regimen after consideration of alternative therapies, and other non-opioid treatment.  PADT and Opioid Risk Screening: See Flowsheet  Score 0    Sleep Aids/Stimulants/:   What is the patient's goal of therapy? Improve sleep  Is this being achieved with current treatment? Yes    Activities of Daily Living:   Is your overall impression that this patient is benefiting (symptom reduction outweighs side effects) from therapy? Yes     1. Physical Functioning: Same  2. Family Relationship: Same  3. Social Relationship: Same  4.  Mood: Same  5. Sleep Patterns: Same  6. Overall Function: Same,

## 2023-06-23 NOTE — ASSESSMENT & PLAN NOTE
Annual Wellness exam completed   Preventive Health history reviewed:  Vaccines today: none  Labs ordered    Mammogram ordered  Cologuard ordered for 3/24  Depression Screening done  Advanced Directives Discussion Completed  Cardiovascular risk discussed and if needed, lifestyle modifications recommended, including nutritional choices, exercise, and elimination of habits contributing to risk.  We agreed on a plan to reduce the current cardiovascular risk.  See ecalc ASCVD Risk  Plus for data discussed regarding risk and risk reduction opportunities.  Aspirin use/disuse was discussed after reviewing the updated guidelines.

## 2023-07-05 ENCOUNTER — APPOINTMENT (OUTPATIENT)
Dept: PRIMARY CARE | Facility: CLINIC | Age: 78
End: 2023-07-05
Payer: MEDICARE

## 2023-07-19 ENCOUNTER — TELEPHONE (OUTPATIENT)
Dept: PRIMARY CARE | Facility: CLINIC | Age: 78
End: 2023-07-19
Payer: MEDICARE

## 2023-07-19 NOTE — TELEPHONE ENCOUNTER
Preventive Care at the 4 Year Visit  Recommendations in caring for Booker:      History of Recurrent Cellulitis and low IgG--  Seasonal Allergies--  May make an appointment with Dr. Jones, Allergy (and Immunology), at the [facility] ([phone]) if further testing and/or management such as immunotherapy (e.g. allergy shots) desired. No antihistamines 7 days prior to appointment.   Recommend using a over-the-counter 24-hr systemic antihistamine including cetirizine (Zyrtec), loratadine (Claritin) daily as needed.   Recommend using a nasal steroid such as over-the-counter fluticasone (Flonase).  Reviewed strategies for limiting allergen exposure. May review American Academy of Allergy Asthma and Immunology (www.aaaai.org) and the Asthma and Allergy Foundation of [place] (www.aafa.org) web sites for more tips.     Scrotal Rash--  Recommend seeing a dermatologist. Please call insurance to identify covered providers.   The following are some recommended providers:   [facility] in [place]: [phone], [facility]: [phone], the [facility]: [phone] and [facility]: [phone].   Please call to inform the peds team of your appointment.     Obstructive Breathing--  Recommend ENT evaluation for a tonsillectomy and adenoidectomy.   Recommend trial of Flonase for at least 2 weeks prior to visit.     Dr. Patrick (comes to the [facility]) [phone]  [facility]: [facility]  [phone]  UMP: [facility] [phone]     [facility]: [facility], Dr. Moreno  [phone]  [facility]) [phone]  [facility] [phone]                   Growth Percentiles & Measurements   Weight: 50 lbs 8 oz / 22.9 kg (actual weight) / 90 %ile based on CDC 2-20 Years  [facility] collected 7/18/23 [phone]  BUN = 25  RBC = 3.99    Urine micro:  WBC   RBC normal   Bacteria 2+    B12 = 273  D = 66.6     Triage for UTI symptoms. If positive sx, may want to do VV with NP today for empiric antibiotics.  Add on culture   Has she been taking B12 1000 mcg every day? If yes, may want to consider injections monthly   She has a hx of kidney stones; how much Vit D supp is she taking daily?    "weight-for-age data using vitals from 1/22/2018.   Length: 3' 10.654\" / 118.5 cm 95 %ile based on CDC 2-20 Years stature-for-age data using vitals from 1/22/2018.   BMI: Body mass index is 16.31 kg/(m^2). 75 %ile based on CDC 2-20 Years BMI-for-age data using vitals from 1/22/2018.   Blood Pressure: Blood pressure percentiles are 19.2 % systolic and 29.7 % diastolic based on NHBPEP's 4th Report.     Your child s next Preventive Check-up will be at 6-7 years of age    Development      Your child is more coordinated and has better balance. He can usually get dressed alone (except for tying shoelaces).    Your child can brush his teeth alone. Make sure to check your child s molars. Your child should spit out the toothpaste.    Your child will push limits you set, but will feel secure within these limits.    Your child should have had  screening with your school district. Your health care provider can help you assess school readiness. Signs your child may be ready for  include:     plays well with other children     follows simple directions and rules and waits for his turn     can be away from home for half a day    Read to your child every day at least 15 minutes.    Limit the time your child watches TV to 1 to 2 hours or less each day. This includes video and computer games. Supervise the TV shows/videos your child watches.    Encourage writing and drawing. Children at this age can often write their own name and recognize most letters of the alphabet. Provide opportunities for your child to tell simple stories and sing children s songs.    Diet      Encourage good eating habits. Lead by example! Do not make  special  separate meals for him.    Offer your child nutritious snacks such as fruits, vegetables, yogurt, turkey, or cheese.  Remember, snacks are not an essential part of the daily diet and do add to the total calories consumed each day.  Be careful. Do not over feed your child. Avoid foods " high in sugar or fat. Cut up any food that could cause choking.    Let your child help plan and make simple meals. He can set and clean up the table, pour cereal or make sandwiches. Always supervise any kitchen activity.    Make mealtime a pleasant time.    Restrict pop to rare occasions. Limit juice to 4 to 6 ounces a day.    Sleep      Children thrive on routine. Continue a routine which includes may include bathing, teeth brushing and reading. Avoid active play least 30 minutes before settling down.    Make sure you have enough light for your child to find his way to the bathroom at night.     Your child needs about ten hours of sleep each night.    Exercise      The American Heart Association recommends children get 60 minutes of moderate to vigorous physical activity each day. This time can be divided into chunks: 30 minutes physical education in school, 10 minutes playing catch, and a 20-minute family walk.    In addition to helping build strong bones and muscles, regular exercise can reduce risks of certain diseases, reduce stress levels, increase self-esteem, help maintain a healthy weight, improve concentration, and help maintain good cholesterol levels.    Safety    Your child needs to be in a car seat or booster seat until he is 4 feet 9 inches (57 inches) tall.  Be sure all other adults and children are buckled as well.    Make sure your child wears a bicycle helmet any time he rides a bike.    Make sure your child wears a helmet and pads any time he uses in-line skates or roller-skates.    Practice bus and street safety.    Practice home fire drills and fire safety.    Supervise your child at playgrounds. Do not let your child play outside alone. Teach your child what to do if a stranger comes up to him. Warn your child never to go with a stranger or accept anything from a stranger. Teach your child to say  NO  and tell an adult he trusts.    Enroll your child in swimming lessons, if appropriate. Teach  your child water safety. Make sure your child is always supervised and wears a life jacket whenever around a lake or river.    Teach your child animal safety.    Have your child practice his or her name, address, phone number. Teach him how to dial 9-1-1.    Keep all guns out of your child s reach. Keep guns and ammunition locked up in different parts of the house.     Self-esteem    Provide support, attention and enthusiasm for your child s abilities and achievements.    Create a schedule of simple chores for your child -- cleaning his room, helping to set the table, helping to care for a pet, etc. Have a reward system and be flexible but consistent expectations. Do not use food as a reward.    Discipline    Time outs are still effective discipline. A time out is usually 1 minute for each year of age. If your child needs a time out, set a kitchen timer for 5 minutes. Place your child in a dull place (such as a hallway or corner of a room). Make sure the room is free of any potential dangers. Be sure to look for and praise good behavior shortly after the time out is over.    Always address the behavior. Do not praise or reprimand with general statements like  You are a good girl  or  You are a naughty boy.  Be specific in your description of the behavior.    Use logical consequences, whenever possible. Try to discuss which behaviors have consequences and talk to your child.    Choose your battles.    Use discipline to teach, not punish. Be fair and consistent with discipline.    Dental Care     Have your child brush his teeth every day, preferably before bedtime.    May start to lose baby teeth.  First tooth may become loose between ages 5 and 7.    Make regular dental appointments for cleanings and check-ups. (Your child may need fluoride tablets if you have well water.)

## 2023-07-19 NOTE — TELEPHONE ENCOUNTER
Left message for patient to call us back  Called [facility]. The UA reflexed to culture due to bacteria.  The results are not back yet.  The tech is sending over a fax to reflect that message

## 2023-07-21 DIAGNOSIS — N39.0 URINARY TRACT INFECTION WITHOUT HEMATURIA, SITE UNSPECIFIED: Primary | ICD-10-CM

## 2023-07-21 RX ORDER — NITROFURANTOIN 25; 75 MG/1; MG/1
100 CAPSULE ORAL 2 TIMES DAILY
Qty: 14 CAPSULE | Refills: 0 | Status: SHIPPED | OUTPATIENT
Start: 2023-07-21 | End: 2023-07-28

## 2023-07-21 NOTE — PROGRESS NOTES
Received urine cx results from [facility].   E coli > 100,000  Sent macrobid

## 2023-08-10 ENCOUNTER — OFFICE VISIT (OUTPATIENT)
Dept: PRIMARY CARE | Facility: CLINIC | Age: 78
End: 2023-08-10
Payer: MEDICARE

## 2023-08-10 VITALS
WEIGHT: 136 LBS | HEIGHT: 64 IN | BODY MASS INDEX: 23.22 KG/M2 | TEMPERATURE: 98.6 F | SYSTOLIC BLOOD PRESSURE: 129 MMHG | HEART RATE: 66 BPM | OXYGEN SATURATION: 96 % | DIASTOLIC BLOOD PRESSURE: 70 MMHG

## 2023-08-10 DIAGNOSIS — R20.0 ARM NUMBNESS LEFT: Primary | ICD-10-CM

## 2023-08-10 DIAGNOSIS — E53.8 VITAMIN B12 DEFICIENCY: ICD-10-CM

## 2023-08-10 DIAGNOSIS — M19.90 ARTHRITIS: ICD-10-CM

## 2023-08-10 PROCEDURE — 1036F TOBACCO NON-USER: CPT | Performed by: NURSE PRACTITIONER

## 2023-08-10 PROCEDURE — 1160F RVW MEDS BY RX/DR IN RCRD: CPT | Performed by: NURSE PRACTITIONER

## 2023-08-10 PROCEDURE — 99215 OFFICE O/P EST HI 40 MIN: CPT | Performed by: NURSE PRACTITIONER

## 2023-08-10 PROCEDURE — 3078F DIAST BP <80 MM HG: CPT | Performed by: NURSE PRACTITIONER

## 2023-08-10 PROCEDURE — 93000 ELECTROCARDIOGRAM COMPLETE: CPT | Performed by: NURSE PRACTITIONER

## 2023-08-10 PROCEDURE — 3074F SYST BP LT 130 MM HG: CPT | Performed by: NURSE PRACTITIONER

## 2023-08-10 PROCEDURE — 1159F MED LIST DOCD IN RCRD: CPT | Performed by: NURSE PRACTITIONER

## 2023-08-10 RX ORDER — HYDROCODONE BITARTRATE AND ACETAMINOPHEN 5; 325 MG/1; MG/1
1 TABLET ORAL EVERY 8 HOURS PRN
Qty: 90 TABLET | Refills: 0 | Status: SHIPPED | OUTPATIENT
Start: 2023-08-10 | End: 2023-09-09

## 2023-08-10 ASSESSMENT — ENCOUNTER SYMPTOMS: NUMBNESS: 1

## 2023-08-10 NOTE — ASSESSMENT & PLAN NOTE
IO EKG stable   suspect carpal tunnel with pos tinel   will try cock-up brace to see if that helps  defers neuro at this time

## 2023-08-10 NOTE — PROGRESS NOTES
"Problem List Items Addressed This Visit       Arm numbness left - Primary    Current Assessment & Plan     suspect carpal tunnel with pos tinel   will try cock-up brace to see if that helps  defers neuro at this time          Relevant Orders    ECG 12 lead (Clinic Performed)    Arthritis    Overview     Uses Vicodin prn hip OA  Declines surgical intervention  Tolerates w/o adverse effects  Stable  Continue         Relevant Medications    HYDROcodone-acetaminophen (Norco) 5-325 mg tablet    Vitamin B12 deficiency    Overview     0/19 level was 260  12/2020 = 406  on PO supp daily         Current Assessment & Plan     Needs to restart supp             Subjective   Patient ID: Meg Luong is a 78 y.o. female who presents for Numbness (Left arm numbness x 5 days/Starts in the back of the arm to the fingers/Denies cp, dizziness, sob, jaw pain, nausea vomiting/Does have neck pain/Did call Cards and was rec'd to call the pcp).  Neuropathy        Scheduled with cards 8/29/23  L arm numbness no worse/better  - abrupt onset when she woke up and arm was \"asleep\"  Numbness comes and goes   Mid upper arm into fingers, which tingle   No skin color changes or coldness   Equal strength  No trauma  Elbow or shoulder no pain  ROM intact   Palm of hand gets shooting pains up into forearm     3/21/22 MRI Cspine  Multilevel cervical spondylosis with bulging disc, greatest at C4-5.  There is mass effect upon the ventral surface of the spinal cord at  C4-5 asymmetric to the left. There is bilateral multilevel neural  foraminal narrowing of the cervical spine which is more pronounced at  C3-4 on the right, C4-5 and C5-6 on the left, and C6-7 bilaterally.     There is no intramedullary mass or edema.     No acute osseous abnormality.    Review of Systems   Neurological:  Positive for numbness.   All other systems reviewed and are negative.      BP Readings from Last 3 Encounters:   08/10/23 129/70   06/23/23 130/76   04/07/23 136/64    " "  Wt Readings from Last 3 Encounters:   08/10/23 61.7 kg (136 lb)   06/23/23 64 kg (141 lb)   04/07/23 64.7 kg (142 lb 9.6 oz)      BMI:   Estimated body mass index is 23.71 kg/m² as calculated from the following:    Height as of this encounter: 1.613 m (5' 3.5\").    Weight as of this encounter: 61.7 kg (136 lb).    Objective   Physical Exam  Constitutional:       General: She is not in acute distress.  HENT:      Head: Normocephalic and atraumatic.      Right Ear: External ear normal.      Left Ear: External ear normal.      Nose: Nose normal.   Eyes:      Extraocular Movements: Extraocular movements intact.      Conjunctiva/sclera: Conjunctivae normal.   Neck:      Comments: L cspine paraspinal muscles are tender   Cardiovascular:      Rate and Rhythm: Normal rate and regular rhythm.      Pulses: Normal pulses.   Pulmonary:      Effort: Pulmonary effort is normal.      Breath sounds: Normal breath sounds.   Abdominal:      Palpations: Abdomen is soft.   Musculoskeletal:         General: Normal range of motion.      Cervical back: Normal range of motion and neck supple.      Comments: LUE FROM wo pain  Phalen negative. Tinel positive with distal paresthesia    Skin:     General: Skin is warm and dry.   Neurological:      General: No focal deficit present.      Mental Status: She is alert.   Psychiatric:         Mood and Affect: Mood normal.       "

## 2023-09-11 DIAGNOSIS — F51.04 CHRONIC INSOMNIA: ICD-10-CM

## 2023-09-11 RX ORDER — ZOLPIDEM TARTRATE 5 MG/1
5 TABLET ORAL NIGHTLY PRN
Qty: 90 TABLET | Refills: 0 | Status: SHIPPED | OUTPATIENT
Start: 2023-09-11 | End: 2023-12-08 | Stop reason: SDUPTHER

## 2023-09-12 DIAGNOSIS — E78.2 MIXED HYPERLIPIDEMIA: ICD-10-CM

## 2023-09-13 RX ORDER — ATORVASTATIN CALCIUM 80 MG/1
80 TABLET, FILM COATED ORAL DAILY
Qty: 90 TABLET | Refills: 3 | Status: SHIPPED | OUTPATIENT
Start: 2023-09-13

## 2023-09-26 RX ORDER — HYDROCODONE BITARTRATE AND ACETAMINOPHEN 5; 325 MG/1; MG/1
1 TABLET ORAL EVERY 8 HOURS PRN
COMMUNITY
End: 2023-09-29 | Stop reason: SDUPTHER

## 2023-09-26 NOTE — PROGRESS NOTES
Subjective   Patient ID: Meg Luong is a 78 y.o. female who presents for No chief complaint on file..    HPI    Review of Systems  ROS completely negative except what was mentioned in the HPI.  Problem List, surgical, social, and family histories which were reviewed and updated as necessary within the EMR. I also personally reviewed the notes, labs, and imaging that pertained to what was documented or discussed in the HPI.    Objective   Physical Exam    There were no vitals taken for this visit.    Assessment/Plan    Problem List Items Addressed This Visit    None     OARRS:  No data recorded  I have personally reviewed the OARRS report for Meg Luong. I have considered the risks of abuse, dependence, addiction and diversion and I believe that it is clinically appropriate for Meg Luong to be prescribed this medication    Is the patient prescribed a combination of a benzodiazepine and opioid?  No    Last Urine Drug Screen / ordered today: No  Recent Results (from the past 8760 hour(s))   OPIATE/OPIOID/BENZO PRESCRIPTION COMPLIANCE    Collection Time: 01/04/23  8:53 AM   Result Value Ref Range    DRUG SCREEN COMMENT URINE SEE BELOW     Creatine, Urine 75.0 mg/dL    Amphetamine Screen, Urine PRESUMPTIVE NEGATIVE NEGATIVE    Barbiturate Screen, Urine PRESUMPTIVE NEGATIVE NEGATIVE    Cannabinoid Screen, Urine PRESUMPTIVE NEGATIVE NEGATIVE    Cocaine Screen, Urine PRESUMPTIVE NEGATIVE NEGATIVE    PCP Screen, Urine PRESUMPTIVE NEGATIVE NEGATIVE    7-Aminoclonazepam <25 Cutoff <25 ng/mL    Alpha-Hydroxyalprazolam <25 Cutoff <25 ng/mL    Alpha-Hydroxymidazolam <25 Cutoff <25 ng/mL    Alprazolam <25 Cutoff <25 ng/mL    Chlordiazepoxide <25 Cutoff <25 ng/mL    Clonazepam <25 Cutoff <25 ng/mL    Diazepam <25 Cutoff <25 ng/mL    Lorazepam <25 Cutoff <25 ng/mL    Midazolam <25 Cutoff <25 ng/mL    Nordiazepam <25 Cutoff <25 ng/mL    Oxazepam <25 Cutoff <25 ng/mL    Temazepam <25 Cutoff <25 ng/mL    Zolpidem <25 Cutoff  <25 ng/mL    Zolpidem Metabolite (ZCA) >1000 (A) Cutoff <25 ng/mL    6-Acetylmorphine <25 Cutoff <25 ng/mL    Codeine <50 Cutoff <50 ng/mL    Hydrocodone 121 (A) Cutoff <25 ng/mL    Hydromorphone 126 (A) Cutoff <25 ng/mL    Morphine Urine <50 Cutoff <50 ng/mL    Norhydrocodone 501 (A) Cutoff <25 ng/mL    Noroxycodone <25 Cutoff <25 ng/mL    Oxycodone <25 Cutoff <25 ng/mL    Oxymorphone <25 Cutoff <25 ng/mL    Tramadol <50 Cutoff <50 ng/mL    O-Desmethyltramadol <50 Cutoff <50 ng/mL    Fentanyl <2.5 Cutoff<2.5 ng/mL    Norfentanyl <2.5 Cutoff<2.5 ng/mL    METHADONE CONFIRMATION,URINE <25 Cutoff <25 ng/mL    EDDP <25 Cutoff <25 ng/mL     Results are as expected.         Controlled Substance Agreement:  Date of the Last Agreement: 23  Reviewed Controlled Substance Agreement including but not limited to the benefits, risks, and alternatives to treatment with a Controlled Substance medication(s).    Opioids:  What is the patient's goal of therapy? ***  Is this being achieved with current treatment? ***    I have calculated the patient's Morphine Dose Equivalent (MED):   {MEDReferral:92779}    {Opioid Attestation Statement:63808}    Opioid Risk Screening:  Family History of Substance Abuse  Alcohol: 0 (2023  4:00 PM)  Illegal Dru (2023  4:00 PM)  Prescription Dru (2023  4:00 PM)    Personal History of Substance Abuse  Alcohol: 0 (2023  4:00 PM)  Illegal Drugs: 0 (2023  4:00 PM)  Prescription Drugs: 0 (2023  4:00 PM)    Patient Age (16-45)  Age (16-45): 0 (2023  4:00 PM)    History of Preadolescent Sexual Abuse  History of Preadolescent Sexual Abuse: 0 (2023  4:00 PM)    Psychological Disease  Attention Deficit Disorder, Obsessive Compulsive Disorder, Bipolar, Schizophrenia: 0 (2023  4:00 PM)  Depression: 0 (2023  4:00 PM)    Total Score  Total Score: 0 (2023  4:00 PM)    Total Score Risk Category  TOTAL SCORE CATEGORY: Low Risk (0-3) (2023  4:00  PM)        Pain Assessment:  No data recorded and Sleep Aids:   What is the patient's goal of therapy? ***  Is this being achieved with current treatment? ***    Activities of Daily Living:   Is your overall impression that this patient is benefiting (symptom reduction outweighs side effects) from sleep aid therapy? {YES/NO:03627}    1. Physical Functioning: {Assessment:38744}  2. Family Relationship: {Assessment:29180}  3. Social Relationship: {Assessment:14169}  4. Mood: {Assessment:39807}  5. Sleep Patterns: {Assessment:77495}  6. Overall Function: {Assessment:23887}

## 2023-09-29 ENCOUNTER — APPOINTMENT (OUTPATIENT)
Dept: PRIMARY CARE | Facility: CLINIC | Age: 78
End: 2023-09-29
Payer: MEDICARE

## 2023-09-29 RX ORDER — HYDROCODONE BITARTRATE AND ACETAMINOPHEN 5; 325 MG/1; MG/1
1 TABLET ORAL EVERY 8 HOURS PRN
Qty: 90 TABLET | Refills: 0 | Status: SHIPPED | OUTPATIENT
Start: 2023-09-29 | End: 2023-10-29

## 2023-10-02 ENCOUNTER — APPOINTMENT (OUTPATIENT)
Dept: PRIMARY CARE | Facility: CLINIC | Age: 78
End: 2023-10-02
Payer: MEDICARE

## 2023-10-04 ENCOUNTER — OFFICE VISIT (OUTPATIENT)
Dept: PRIMARY CARE | Facility: CLINIC | Age: 78
End: 2023-10-04
Payer: MEDICARE

## 2023-10-04 VITALS
WEIGHT: 136.2 LBS | DIASTOLIC BLOOD PRESSURE: 62 MMHG | HEART RATE: 57 BPM | OXYGEN SATURATION: 97 % | TEMPERATURE: 97.2 F | BODY MASS INDEX: 23.25 KG/M2 | HEIGHT: 64 IN | SYSTOLIC BLOOD PRESSURE: 117 MMHG

## 2023-10-04 DIAGNOSIS — F11.90 OPIOID USE: Primary | ICD-10-CM

## 2023-10-04 DIAGNOSIS — F51.04 CHRONIC INSOMNIA: ICD-10-CM

## 2023-10-04 DIAGNOSIS — Z79.899 MEDICATION MANAGEMENT: ICD-10-CM

## 2023-10-04 DIAGNOSIS — M50.90 CERVICAL DISC DISORDER: ICD-10-CM

## 2023-10-04 DIAGNOSIS — M19.90 ARTHRITIS: ICD-10-CM

## 2023-10-04 PROCEDURE — 3078F DIAST BP <80 MM HG: CPT | Performed by: NURSE PRACTITIONER

## 2023-10-04 PROCEDURE — 99213 OFFICE O/P EST LOW 20 MIN: CPT | Performed by: NURSE PRACTITIONER

## 2023-10-04 PROCEDURE — 1036F TOBACCO NON-USER: CPT | Performed by: NURSE PRACTITIONER

## 2023-10-04 PROCEDURE — 1159F MED LIST DOCD IN RCRD: CPT | Performed by: NURSE PRACTITIONER

## 2023-10-04 PROCEDURE — 3074F SYST BP LT 130 MM HG: CPT | Performed by: NURSE PRACTITIONER

## 2023-10-04 PROCEDURE — 1160F RVW MEDS BY RX/DR IN RCRD: CPT | Performed by: NURSE PRACTITIONER

## 2023-10-04 ASSESSMENT — PATIENT HEALTH QUESTIONNAIRE - PHQ9
1. LITTLE INTEREST OR PLEASURE IN DOING THINGS: NOT AT ALL
SUM OF ALL RESPONSES TO PHQ9 QUESTIONS 1 AND 2: 0
2. FEELING DOWN, DEPRESSED OR HOPELESS: NOT AT ALL

## 2023-10-04 NOTE — PROGRESS NOTES
"Subjective   Patient ID: Meg Luong is a 78 y.o. female who presents for csv (Csv Vicodin Ambien).    Eating and drinking well  No change in appetite  No change in bowel or bladder  No sweats/fevers/chills  No SOB or wheezing  No CP or palpitations  No questions or concerns          Review of Systems  ROS completely negative except what was mentioned in the HPI.  Problem List, surgical, social, and family histories which were reviewed and updated as necessary within the EMR. I also personally reviewed the notes, labs, and imaging that pertained to what was documented or discussed in the HPI.    Objective   Physical Exam  Vitals and nursing note reviewed.   Constitutional:       General: She is not in acute distress.     Appearance: Normal appearance.   HENT:      Head: Normocephalic and atraumatic.      Right Ear: External ear normal.      Left Ear: External ear normal.      Nose: Nose normal.      Mouth/Throat:      Mouth: Mucous membranes are moist.   Eyes:      Extraocular Movements: Extraocular movements intact.      Conjunctiva/sclera: Conjunctivae normal.      Pupils: Pupils are equal, round, and reactive to light.   Cardiovascular:      Rate and Rhythm: Normal rate and regular rhythm.      Heart sounds: Normal heart sounds.   Pulmonary:      Effort: Pulmonary effort is normal.      Breath sounds: Normal breath sounds.   Musculoskeletal:         General: Normal range of motion.      Cervical back: Normal range of motion and neck supple.   Skin:     General: Skin is warm and dry.   Neurological:      General: No focal deficit present.      Mental Status: She is alert and oriented to person, place, and time. Mental status is at baseline.   Psychiatric:         Mood and Affect: Mood normal.         Behavior: Behavior normal.         Thought Content: Thought content normal.         Judgment: Judgment normal.         /62   Pulse 57   Temp 36.2 °C (97.2 °F)   Ht 1.613 m (5' 3.5\")   Wt 61.8 kg (136 lb " 3.2 oz)   SpO2 97%   BMI 23.75 kg/m²     Assessment/Plan    Problem List Items Addressed This Visit       Arthritis    Overview     Uses Vicodin prn hip OA  Declines surgical intervention  Tolerates w/o adverse effects  Stable  Continue         Cervical disc disorder    Overview     Narrowing of the C4-C5, C5-C6, and C6-C7 disc spaces  On SNRI for neuropathic pain n past  On Vicodin  symptoms well managed, monitor         Chronic insomnia    Overview     Managed with Ambien  Tolerates well, no AE         Medication management    Overview     CSA, UDS, OARRS, CSV [facility] compliant            Opioid use - Primary    Overview     narcan           OARRS:  Princess Campoverde, APRN-CNP on 10/4/2023 11:03 AM  I have personally reviewed the OARRS report for Meg Luong. I have considered the risks of abuse, dependence, addiction and diversion and I believe that it is clinically appropriate for Meg Luong to be prescribed this medication    Is the patient prescribed a combination of a benzodiazepine and opioid?  No    Last Urine Drug Screen / ordered today: No  Recent Results (from the past 8760 hour(s))   OPIATE/OPIOID/BENZO PRESCRIPTION COMPLIANCE    Collection Time: 01/04/23  8:53 AM   Result Value Ref Range    DRUG SCREEN COMMENT URINE SEE BELOW     Creatine, Urine 75.0 mg/dL    Amphetamine Screen, Urine PRESUMPTIVE NEGATIVE NEGATIVE    Barbiturate Screen, Urine PRESUMPTIVE NEGATIVE NEGATIVE    Cannabinoid Screen, Urine PRESUMPTIVE NEGATIVE NEGATIVE    Cocaine Screen, Urine PRESUMPTIVE NEGATIVE NEGATIVE    PCP Screen, Urine PRESUMPTIVE NEGATIVE NEGATIVE    7-Aminoclonazepam <25 Cutoff <25 ng/mL    Alpha-Hydroxyalprazolam <25 Cutoff <25 ng/mL    Alpha-Hydroxymidazolam <25 Cutoff <25 ng/mL    Alprazolam <25 Cutoff <25 ng/mL    Chlordiazepoxide <25 Cutoff <25 ng/mL    Clonazepam <25 Cutoff <25 ng/mL    Diazepam <25 Cutoff <25 ng/mL    Lorazepam <25 Cutoff <25 ng/mL    Midazolam <25 Cutoff <25 ng/mL    Nordiazepam <25  Cutoff <25 ng/mL    Oxazepam <25 Cutoff <25 ng/mL    Temazepam <25 Cutoff <25 ng/mL    Zolpidem <25 Cutoff <25 ng/mL    Zolpidem Metabolite (ZCA) >1000 (A) Cutoff <25 ng/mL    6-Acetylmorphine <25 Cutoff <25 ng/mL    Codeine <50 Cutoff <50 ng/mL    Hydrocodone 121 (A) Cutoff <25 ng/mL    Hydromorphone 126 (A) Cutoff <25 ng/mL    Morphine Urine <50 Cutoff <50 ng/mL    Norhydrocodone 501 (A) Cutoff <25 ng/mL    Noroxycodone <25 Cutoff <25 ng/mL    Oxycodone <25 Cutoff <25 ng/mL    Oxymorphone <25 Cutoff <25 ng/mL    Tramadol <50 Cutoff <50 ng/mL    O-Desmethyltramadol <50 Cutoff <50 ng/mL    Fentanyl <2.5 Cutoff<2.5 ng/mL    Norfentanyl <2.5 Cutoff<2.5 ng/mL    METHADONE CONFIRMATION,URINE <25 Cutoff <25 ng/mL    EDDP <25 Cutoff <25 ng/mL     Results are as expected.         Controlled Substance Agreement:  Date of the Last Agreement: 23  Reviewed Controlled Substance Agreement including but not limited to the benefits, risks, and alternatives to treatment with a Controlled Substance medication(s).    Opioids:  What is the patient's goal of therapy? Pain relief from arthritis, back and neck pain  Is this being achieved with current treatment? yes    I have calculated the patient's Morphine Dose Equivalent (MED):   I have considered referral to Pain Management and/or a specialist, and do not feel it is necessary at this time.    I feel that it is clinically indicated to continue this current medication regimen after consideration of alternative therapies, and other non-opioid treatment.    Opioid Risk Screening:  Family History of Substance Abuse  Alcohol: 0 (2023  4:00 PM)  Illegal Dru (2023  4:00 PM)  Prescription Dru (2023  4:00 PM)    Personal History of Substance Abuse  Alcohol: 0 (2023  4:00 PM)  Illegal Drugs: 0 (2023  4:00 PM)  Prescription Drugs: 0 (2023  4:00 PM)    Patient Age (16-45)  Age (16-45): 0 (2023  4:00 PM)    History of Preadolescent Sexual  Abuse  History of Preadolescent Sexual Abuse: 0 (6/23/2023  4:00 PM)    Psychological Disease  Attention Deficit Disorder, Obsessive Compulsive Disorder, Bipolar, Schizophrenia: 0 (6/23/2023  4:00 PM)  Depression: 0 (6/23/2023  4:00 PM)    Total Score  Total Score: 0 (6/23/2023  4:00 PM)    Total Score Risk Category  TOTAL SCORE CATEGORY: Low Risk (0-3) (6/23/2023  4:00 PM)        Pain Assessment:  Analgesia  What was your pain level on average during the past week?: 7  What was your pain level at its worst during the past week?: 10 - Pain as bad as it can be  What percentage of your pain has been relieved during the past week?: 60 %  Is the amount of pain relief you are now obtaining from your current pain relievers enough to make a real difference in your life?: Y    Activities of Daily Living  Physical Functioning: Better  Family Relationships: Same  Social Relationships: Same  Mood: Same  Sleep Patterns: Better  Overall Functioning: Better    Adverse Events  Is patient experiencing any side effects from current pain relievers?: N     and Sleep Aids:   What is the patient's goal of therapy? For sleep  Is this being achieved with current treatment? yes    Activities of Daily Living:   Is your overall impression that this patient is benefiting (symptom reduction outweighs side effects) from sleep aid therapy? Yes     1. Physical Functioning: Same  2. Family Relationship: Same  3. Social Relationship: Same  4. Mood: Same  5. Sleep Patterns: Better  6. Overall Function: Better  Patient was identified as a fall risk. Risk prevention instructions provided.

## 2023-10-04 NOTE — PATIENT INSTRUCTIONS

## 2023-10-23 DIAGNOSIS — E55.9 VITAMIN D DEFICIENCY, UNSPECIFIED: ICD-10-CM

## 2023-10-23 RX ORDER — ASPIRIN 325 MG
50000 TABLET, DELAYED RELEASE (ENTERIC COATED) ORAL
Qty: 12 CAPSULE | Refills: 3 | Status: SHIPPED | OUTPATIENT
Start: 2023-10-23

## 2023-11-16 DIAGNOSIS — M19.90 ARTHRITIS: ICD-10-CM

## 2023-11-16 DIAGNOSIS — M50.90 CERVICAL DISC DISORDER: ICD-10-CM

## 2023-11-16 RX ORDER — HYDROCODONE BITARTRATE AND ACETAMINOPHEN 5; 325 MG/1; MG/1
1 TABLET ORAL EVERY 8 HOURS PRN
Qty: 90 TABLET | Refills: 0 | Status: SHIPPED | OUTPATIENT
Start: 2023-11-16 | End: 2023-12-07 | Stop reason: SDUPTHER

## 2023-12-07 DIAGNOSIS — M50.90 CERVICAL DISC DISORDER: ICD-10-CM

## 2023-12-07 DIAGNOSIS — M19.90 ARTHRITIS: ICD-10-CM

## 2023-12-07 RX ORDER — HYDROCODONE BITARTRATE AND ACETAMINOPHEN 5; 325 MG/1; MG/1
1 TABLET ORAL EVERY 8 HOURS PRN
Qty: 90 TABLET | Refills: 0 | Status: SHIPPED | OUTPATIENT
Start: 2023-12-07 | End: 2024-01-05 | Stop reason: SDUPTHER

## 2023-12-08 ENCOUNTER — TELEPHONE (OUTPATIENT)
Dept: PRIMARY CARE | Facility: CLINIC | Age: 78
End: 2023-12-08
Payer: MEDICARE

## 2023-12-08 DIAGNOSIS — F51.04 CHRONIC INSOMNIA: ICD-10-CM

## 2023-12-08 RX ORDER — ZOLPIDEM TARTRATE 5 MG/1
5 TABLET ORAL NIGHTLY PRN
Qty: 90 TABLET | Refills: 0 | Status: SHIPPED | OUTPATIENT
Start: 2023-12-08 | End: 2024-02-26 | Stop reason: SDUPTHER

## 2023-12-08 NOTE — TELEPHONE ENCOUNTER
Patient coming IO needing a new script for Ambien sent to [facility] on [address] in [place]. Please advise

## 2023-12-20 DIAGNOSIS — M50.90 CERVICAL DISC DISORDER: ICD-10-CM

## 2023-12-20 DIAGNOSIS — M19.90 ARTHRITIS: ICD-10-CM

## 2023-12-20 RX ORDER — HYDROCODONE BITARTRATE AND ACETAMINOPHEN 5; 325 MG/1; MG/1
1 TABLET ORAL EVERY 8 HOURS PRN
Qty: 90 TABLET | Refills: 0 | Status: CANCELLED | OUTPATIENT
Start: 2023-12-20

## 2023-12-20 NOTE — TELEPHONE ENCOUNTER
Patient left vm for refill norco   Request is too soon   Looks like was just filled on 12/7/23  Left her a vm to call back to clarify

## 2024-01-05 ENCOUNTER — TELEPHONE (OUTPATIENT)
Dept: PRIMARY CARE | Facility: CLINIC | Age: 79
End: 2024-01-05

## 2024-01-05 ENCOUNTER — OFFICE VISIT (OUTPATIENT)
Dept: PRIMARY CARE | Facility: CLINIC | Age: 79
End: 2024-01-05
Payer: MEDICARE

## 2024-01-05 VITALS
OXYGEN SATURATION: 96 % | WEIGHT: 136.8 LBS | HEART RATE: 64 BPM | SYSTOLIC BLOOD PRESSURE: 137 MMHG | TEMPERATURE: 97.5 F | DIASTOLIC BLOOD PRESSURE: 87 MMHG | BODY MASS INDEX: 23.85 KG/M2

## 2024-01-05 DIAGNOSIS — Z79.899 ENCOUNTER FOR MONITORING DENOSUMAB THERAPY: ICD-10-CM

## 2024-01-05 DIAGNOSIS — I77.819 DILATION OF AORTA (CMS-HCC): ICD-10-CM

## 2024-01-05 DIAGNOSIS — M85.9 DISORDER OF BONE DENSITY AND STRUCTURE, UNSPECIFIED: ICD-10-CM

## 2024-01-05 DIAGNOSIS — M50.90 CERVICAL DISC DISORDER: Primary | ICD-10-CM

## 2024-01-05 DIAGNOSIS — Z51.81 ENCOUNTER FOR MONITORING DENOSUMAB THERAPY: ICD-10-CM

## 2024-01-05 DIAGNOSIS — Z79.891 ENCOUNTER FOR LONG-TERM OPIATE ANALGESIC USE: ICD-10-CM

## 2024-01-05 DIAGNOSIS — M19.90 ARTHRITIS: ICD-10-CM

## 2024-01-05 DIAGNOSIS — Z79.899 MEDICATION MANAGEMENT: ICD-10-CM

## 2024-01-05 PROBLEM — Z79.620 ENCOUNTER FOR MONITORING DENOSUMAB THERAPY: Status: ACTIVE | Noted: 2024-01-05

## 2024-01-05 PROCEDURE — 80365 DRUG SCREENING OXYCODONE: CPT

## 2024-01-05 PROCEDURE — 80346 BENZODIAZEPINES1-12: CPT

## 2024-01-05 PROCEDURE — 1160F RVW MEDS BY RX/DR IN RCRD: CPT | Performed by: NURSE PRACTITIONER

## 2024-01-05 PROCEDURE — 80361 OPIATES 1 OR MORE: CPT

## 2024-01-05 PROCEDURE — 80307 DRUG TEST PRSMV CHEM ANLYZR: CPT

## 2024-01-05 PROCEDURE — 96372 THER/PROPH/DIAG INJ SC/IM: CPT | Performed by: NURSE PRACTITIONER

## 2024-01-05 PROCEDURE — 3075F SYST BP GE 130 - 139MM HG: CPT | Performed by: NURSE PRACTITIONER

## 2024-01-05 PROCEDURE — 80368 SEDATIVE HYPNOTICS: CPT

## 2024-01-05 PROCEDURE — 80354 DRUG SCREENING FENTANYL: CPT

## 2024-01-05 PROCEDURE — 3079F DIAST BP 80-89 MM HG: CPT | Performed by: NURSE PRACTITIONER

## 2024-01-05 PROCEDURE — 82570 ASSAY OF URINE CREATININE: CPT

## 2024-01-05 PROCEDURE — 1159F MED LIST DOCD IN RCRD: CPT | Performed by: NURSE PRACTITIONER

## 2024-01-05 PROCEDURE — 99214 OFFICE O/P EST MOD 30 MIN: CPT | Performed by: NURSE PRACTITIONER

## 2024-01-05 PROCEDURE — 1036F TOBACCO NON-USER: CPT | Performed by: NURSE PRACTITIONER

## 2024-01-05 PROCEDURE — 80358 DRUG SCREENING METHADONE: CPT

## 2024-01-05 PROCEDURE — 80373 DRUG SCREENING TRAMADOL: CPT

## 2024-01-05 RX ORDER — HYDROCODONE BITARTRATE AND ACETAMINOPHEN 5; 325 MG/1; MG/1
1 TABLET ORAL EVERY 8 HOURS PRN
Qty: 90 TABLET | Refills: 0 | Status: SHIPPED | OUTPATIENT
Start: 2024-01-05 | End: 2024-02-26 | Stop reason: SDUPTHER

## 2024-01-05 ASSESSMENT — PATIENT HEALTH QUESTIONNAIRE - PHQ9
SUM OF ALL RESPONSES TO PHQ9 QUESTIONS 1 AND 2: 0
2. FEELING DOWN, DEPRESSED OR HOPELESS: NOT AT ALL
1. LITTLE INTEREST OR PLEASURE IN DOING THINGS: NOT AT ALL

## 2024-01-05 NOTE — PROGRESS NOTES
Problem List Items Addressed This Visit       Arthritis    Overview     Uses Vicodin prn hip OA  Declines surgical intervention  Tolerates w/o adverse effects  Stable  Continue         Relevant Medications    HYDROcodone-acetaminophen (Norco) 5-325 mg tablet    Cervical disc disorder - Primary    Overview     Narrowing of the C4-C5, C5-C6, and C6-C7 disc spaces  On SNRI for neuropathic pain n past  On Vicodin  symptoms well managed, monitor         Relevant Medications    HYDROcodone-acetaminophen (Norco) 5-325 mg tablet    Other Relevant Orders    Opiate/Opioid/Benzo Prescription Compliance    OOB Internal Tracking    Dilation of aorta (CMS/HCC)    Overview     ECHO 5/22: Aorta dilated at 4.0cm at Sinus   sees [facility]  Monitor         Disorder of bone density and structure, unspecified    Overview     Prolia          Relevant Medications    denosumab (Prolia) injection 60 mg (Start on 1/6/2024 12:00 AM)    Encounter for long-term opiate analgesic use    Overview     narcan         Encounter for monitoring denosumab therapy    Overview     Prolia 1/5/24         Relevant Medications    denosumab (Prolia) injection 60 mg (Start on 1/6/2024 12:00 AM)    Medication management    Overview     CSA, UDS, OARRS, CSV [facility] compliant            Relevant Orders    Opiate/Opioid/Benzo Prescription Compliance    OOB Internal Tracking        Controlled Substance Visit:  I have personally reviewed the OARRS report and have considered the risks of abuse, dependence, addiction and diversion and I believe that it is clinically appropriate for the patient to be prescribed this medication.    Is the patient prescribed a combination of a benzodiazepine and opioid?  No    Last Urine Drug Screen / ordered today: Yes  No results found for this or any previous visit (from the past 8760 hour(s)).  Results are as expected.     Controlled Substance Agreement:  Date of the Last Agreement: 1/5/24  I have reviewed each line item on the  Controlled Substance Agreement including, but not limited to, the benefits, risks, and alternatives to treatment with a Controlled Substance medication(s). The patient has verbalized understanding and signed the agreement.    Opioids:  What is the patient's goal of therapy? Pain   Is this being achieved with current treatment? yes    I have calculated the patient's Morphine Dose Equivalent (MED): 15 mme/day  I have considered referral to Pain Management and/or a specialist, and do not feel it is necessary at this time.    I feel that it is clinically indicated to continue this current medication regimen after consideration of alternative therapies, and other non-opioid treatment.    Opioid Risk Screening:  Family History of Substance Abuse  Alcohol: 0 (2023  4:00 PM)  Illegal Dru (2023  4:00 PM)  Prescription Dru (2023  4:00 PM)    Personal History of Substance Abuse  Alcohol: 0 (2023  4:00 PM)  Illegal Drugs: 0 (2023  4:00 PM)  Prescription Drugs: 0 (2023  4:00 PM)    Patient Age (16-45)  Age (16-45): 0 (2023  4:00 PM)    History of Preadolescent Sexual Abuse  History of Preadolescent Sexual Abuse: 0 (2023  4:00 PM)    Psychological Disease  Attention Deficit Disorder, Obsessive Compulsive Disorder, Bipolar, Schizophrenia: 0 (2023  4:00 PM)  Depression: 0 (2023  4:00 PM)    Total Score  Total Score: 0 (2023  4:00 PM)    Total Score Risk Category  TOTAL SCORE CATEGORY: Low Risk (0-3) (2023  4:00 PM)        Pain Assessment:  Analgesia  What was your pain level on average during the past week?: 7  What was your pain level at its worst during the past week?: 9  What percentage of your pain has been relieved during the past week?: 65 %  Is the amount of pain relief you are now obtaining from your current pain relievers enough to make a real difference in your life?: Y    Activities of Daily Living  Physical Functioning: Same  Family Relationships:  Same  Social Relationships: Same  Mood: Same  Sleep Patterns: Better  Overall Functioning: Same     and Sleep Aids:   What is the patient's goal of therapy? Sleep   Is this being achieved with current treatment? yes    Activities of Daily Living:   Is your overall impression that this patient is benefiting (symptom reduction outweighs side effects) from sleep aid therapy? Yes     1. Physical Functioning: Same  2. Family Relationship: Same  3. Social Relationship: Same  4. Mood: Same  5. Sleep Patterns: Better  6. Overall Function: Same    Prolia  Dentis is dr carreno  Calcium 8.9 on 7/18/23  Vitamin D 66.6 on 7/18/23  Patient has not had any recent dental work  Advised to continue calcium/vit d supplements    Gen: Alert, NAD  HEENT:  PERRLA, EOMI, conjunctiva and sclera normal in appearance; Neck supple  Respiratory:  Lungs CTAB  Cardiovascular:  Heart RRR. No M/R/G  Neuro:  Gross motor and sensory intact  Skin:  No suspicious lesions present   Mood: normal

## 2024-01-05 NOTE — TELEPHONE ENCOUNTER
I left a message at Dr. Damián Paulino's office, patient's dentist for a return call.  Patient seen Cherise CRESPO today and had a Prolia injection.    Patient stated she is scheduled for dental work in 3 weeks.  She is scheduled for caps.  Patient was advised that she should check with her dentist first before having the Prolia injection today.  Patient chose to have the injection and will reschedule her dental appt. Patient will speak with her dentist in regards to the prolia injection.  Cherise would like a call placed to Dr. Paulino's office to make him aware of this injection as well.  [phone]

## 2024-01-06 LAB
AMPHETAMINES UR QL SCN: NORMAL
BARBITURATES UR QL SCN: NORMAL
BZE UR QL SCN: NORMAL
CANNABINOIDS UR QL SCN: NORMAL
CREAT UR-MCNC: 60.3 MG/DL (ref 20–320)
PCP UR QL SCN: NORMAL

## 2024-01-08 NOTE — TELEPHONE ENCOUNTER
FLACO  Spoke with Belle at the dentist office.  Relayed message that patient had a prolia injection on 1/5/24.  She stated she only has a cleaning scheduled as of right now.  She will let the dentist know and if they have any further questions they will call us back.

## 2024-01-13 LAB
1OH-MIDAZOLAM UR CFM-MCNC: <25 NG/ML
6MAM UR CFM-MCNC: <25 NG/ML
7AMINOCLONAZEPAM UR CFM-MCNC: <25 NG/ML
A-OH ALPRAZ UR CFM-MCNC: <25 NG/ML
ALPRAZ UR CFM-MCNC: <25 NG/ML
CHLORDIAZEP UR CFM-MCNC: <25 NG/ML
CLONAZEPAM UR CFM-MCNC: <25 NG/ML
CODEINE UR CFM-MCNC: <50 NG/ML
DIAZEPAM UR CFM-MCNC: <25 NG/ML
EDDP UR CFM-MCNC: <25 NG/ML
FENTANYL UR CFM-MCNC: <2.5 NG/ML
HYDROCODONE CTO UR CFM-MCNC: 145 NG/ML
HYDROMORPHONE UR CFM-MCNC: 136 NG/ML
LORAZEPAM UR CFM-MCNC: <25 NG/ML
METHADONE UR CFM-MCNC: <25 NG/ML
MIDAZOLAM UR CFM-MCNC: <25 NG/ML
MORPHINE UR CFM-MCNC: <50 NG/ML
NORDIAZEPAM UR CFM-MCNC: <25 NG/ML
NORFENTANYL UR CFM-MCNC: <2.5 NG/ML
NORHYDROCODONE UR CFM-MCNC: 507 NG/ML
NOROXYCODONE UR CFM-MCNC: <25 NG/ML
NORTRAMADOL UR-MCNC: <50 NG/ML
OXAZEPAM UR CFM-MCNC: <25 NG/ML
OXYCODONE UR CFM-MCNC: <25 NG/ML
OXYMORPHONE UR CFM-MCNC: <25 NG/ML
TEMAZEPAM UR CFM-MCNC: <25 NG/ML
TRAMADOL UR CFM-MCNC: <50 NG/ML
ZOLPIDEM UR CFM-MCNC: 631 NG/ML
ZOLPIDEM UR-MCNC: <25 NG/ML

## 2024-02-07 ENCOUNTER — TELEPHONE (OUTPATIENT)
Dept: PRIMARY CARE | Facility: CLINIC | Age: 79
End: 2024-02-07
Payer: MEDICARE

## 2024-02-07 NOTE — TELEPHONE ENCOUNTER
PA was used with the [facility] insurance company for Prolia injection on 1/5/24.  The claim will be denied with [employer].    I called [employer] to ask about obtaining a backdate request for a medical buy and bill. (Prolia was office supplied)    I spoke with Jennifer on 2/5/24 with [employer] team  She advised to submit clinical information ( OV and Bone Density) requesting a retro review along with the claim.   She told me this should be sent through the normal claim process from the  billing dept.    I asked if this was something I could fax over to [employer] and she told me it needed to come from the billing dept through the normal claim process.    Please advise if there is anything I can help with.      Side note, I did get the approval for the next injection in July 2024 with [employer]. Auth# [id number]   2/5/24-2/4/25

## 2024-02-14 DIAGNOSIS — I10 ESSENTIAL (PRIMARY) HYPERTENSION: ICD-10-CM

## 2024-02-14 RX ORDER — AMLODIPINE AND BENAZEPRIL HYDROCHLORIDE 5; 40 MG/1; MG/1
1 CAPSULE ORAL DAILY
Qty: 90 CAPSULE | Refills: 3 | Status: SHIPPED | OUTPATIENT
Start: 2024-02-14 | End: 2024-02-19 | Stop reason: SDUPTHER

## 2024-02-19 DIAGNOSIS — I10 ESSENTIAL (PRIMARY) HYPERTENSION: ICD-10-CM

## 2024-02-19 RX ORDER — AMLODIPINE AND BENAZEPRIL HYDROCHLORIDE 5; 40 MG/1; MG/1
1 CAPSULE ORAL DAILY
Qty: 90 CAPSULE | Refills: 3 | Status: SHIPPED | OUTPATIENT
Start: 2024-02-19

## 2024-02-26 DIAGNOSIS — M50.90 CERVICAL DISC DISORDER: ICD-10-CM

## 2024-02-26 DIAGNOSIS — M19.90 ARTHRITIS: ICD-10-CM

## 2024-02-26 DIAGNOSIS — F51.04 CHRONIC INSOMNIA: ICD-10-CM

## 2024-02-26 RX ORDER — ZOLPIDEM TARTRATE 5 MG/1
5 TABLET ORAL NIGHTLY PRN
Qty: 90 TABLET | Refills: 0 | Status: SHIPPED | OUTPATIENT
Start: 2024-02-26 | End: 2024-06-05

## 2024-02-26 RX ORDER — HYDROCODONE BITARTRATE AND ACETAMINOPHEN 5; 325 MG/1; MG/1
1 TABLET ORAL EVERY 8 HOURS PRN
Qty: 90 TABLET | Refills: 0 | Status: SHIPPED | OUTPATIENT
Start: 2024-02-26 | End: 2024-03-28 | Stop reason: SDUPTHER

## 2024-03-28 ENCOUNTER — HOSPITAL ENCOUNTER (OUTPATIENT)
Dept: RADIOLOGY | Facility: CLINIC | Age: 79
Discharge: HOME | End: 2024-03-28
Payer: MEDICARE

## 2024-03-28 ENCOUNTER — OFFICE VISIT (OUTPATIENT)
Dept: PRIMARY CARE | Facility: CLINIC | Age: 79
End: 2024-03-28
Payer: MEDICARE

## 2024-03-28 VITALS
OXYGEN SATURATION: 96 % | BODY MASS INDEX: 23.12 KG/M2 | HEART RATE: 60 BPM | WEIGHT: 132.6 LBS | DIASTOLIC BLOOD PRESSURE: 74 MMHG | SYSTOLIC BLOOD PRESSURE: 113 MMHG | TEMPERATURE: 97.2 F

## 2024-03-28 DIAGNOSIS — M79.674 PAIN OF TOE OF RIGHT FOOT: Primary | ICD-10-CM

## 2024-03-28 DIAGNOSIS — M79.674 PAIN OF TOE OF RIGHT FOOT: ICD-10-CM

## 2024-03-28 DIAGNOSIS — M19.90 ARTHRITIS: ICD-10-CM

## 2024-03-28 DIAGNOSIS — M50.90 CERVICAL DISC DISORDER: ICD-10-CM

## 2024-03-28 DIAGNOSIS — Z79.899 MEDICATION MANAGEMENT: ICD-10-CM

## 2024-03-28 PROBLEM — R20.0 ARM NUMBNESS LEFT: Status: RESOLVED | Noted: 2023-08-10 | Resolved: 2024-03-28

## 2024-03-28 PROCEDURE — 3078F DIAST BP <80 MM HG: CPT | Performed by: INTERNAL MEDICINE

## 2024-03-28 PROCEDURE — 73660 X-RAY EXAM OF TOE(S): CPT | Mod: RIGHT SIDE | Performed by: RADIOLOGY

## 2024-03-28 PROCEDURE — 73660 X-RAY EXAM OF TOE(S): CPT | Mod: RT

## 2024-03-28 PROCEDURE — G2211 COMPLEX E/M VISIT ADD ON: HCPCS | Performed by: INTERNAL MEDICINE

## 2024-03-28 PROCEDURE — 1158F ADVNC CARE PLAN TLK DOCD: CPT | Performed by: INTERNAL MEDICINE

## 2024-03-28 PROCEDURE — 1036F TOBACCO NON-USER: CPT | Performed by: INTERNAL MEDICINE

## 2024-03-28 PROCEDURE — 3074F SYST BP LT 130 MM HG: CPT | Performed by: INTERNAL MEDICINE

## 2024-03-28 PROCEDURE — 1159F MED LIST DOCD IN RCRD: CPT | Performed by: INTERNAL MEDICINE

## 2024-03-28 PROCEDURE — 99213 OFFICE O/P EST LOW 20 MIN: CPT | Performed by: INTERNAL MEDICINE

## 2024-03-28 PROCEDURE — 1123F ACP DISCUSS/DSCN MKR DOCD: CPT | Performed by: INTERNAL MEDICINE

## 2024-03-28 PROCEDURE — 1160F RVW MEDS BY RX/DR IN RCRD: CPT | Performed by: INTERNAL MEDICINE

## 2024-03-28 RX ORDER — HYDROCODONE BITARTRATE AND ACETAMINOPHEN 5; 325 MG/1; MG/1
1 TABLET ORAL EVERY 8 HOURS PRN
Qty: 90 TABLET | Refills: 0 | Status: SHIPPED | OUTPATIENT
Start: 2024-03-28 | End: 2024-05-09 | Stop reason: SDUPTHER

## 2024-03-28 RX ORDER — GABAPENTIN 100 MG/1
100 CAPSULE ORAL NIGHTLY
Qty: 30 CAPSULE | Refills: 5 | Status: SHIPPED | OUTPATIENT
Start: 2024-03-28 | End: 2024-09-24

## 2024-03-28 ASSESSMENT — LIFESTYLE VARIABLES: TOTAL SCORE: 0

## 2024-03-28 NOTE — PROGRESS NOTES
CC/HPI:   Pain (Pain in foot. Has plastic in foot that has to be removed - right foot ).  Hammertoe surgery was 25 years  Then had to go back and have re-do on the left  Now the right toe hurts  Keeps her up at night  Shooting pain  Uses vicodin, helps some    Controlled Substance Visit:  I have personally reviewed the OARRS report and have considered the risks of abuse, dependence, addiction and diversion and I believe that it is clinically appropriate for the patient to be prescribed this medication.    Is the patient prescribed a combination of a benzodiazepine and opioid? no     The last Urine Drug Screen has been reviewed and results are as expected,  and/or the UDS was ordered today if due.  Recent Results (from the past 8760 hour(s))   Confirmation Opiate/Opioid/Benzo Prescription Compliance    Collection Time: 01/05/24 10:53 AM   Result Value Ref Range    Clonazepam <25 <25 ng/mL    7-Aminoclonazepam <25 <25 ng/mL    Alprazolam <25 <25 ng/mL    Alpha-Hydroxyalprazolam <25 <25 ng/mL    Midazolam <25 <25 ng/mL    Alpha-Hydroxymidazolam <25 <25 ng/mL    Chlordiazepoxide <25 <25 ng/mL    Diazepam <25 <25 ng/mL    Nordiazepam <25 <25 ng/mL    Temazepam <25 <25 ng/mL    Oxazepam <25 <25 ng/mL    Lorazepam <25 <25 ng/mL    Methadone <25 <25 ng/mL    EDDP <25 <25 ng/mL    6-Acetylmorphine <25 <25 ng/mL    Codeine <50 <50 ng/mL    Hydrocodone 145 (H) <25 ng/mL    Hydromorphone 136 (H) <25 ng/mL    Morphine  <50 <50 ng/mL    Norhydrocodone 507 (H) <25 ng/mL    Noroxycodone <25 <25 ng/mL    Oxycodone <25 <25 ng/mL    Oxymorphone <25 <25 ng/mL    Fentanyl <2.5 <2.5 ng/mL    Norfentanyl <2.5 <2.5 ng/mL    Tramadol <50 <50 ng/mL    O-Desmethyltramadol <50 <50 ng/mL    Zolpidem <25 <25 ng/mL    Zolpidem Metabolite (ZCA) 631 (H) <25 ng/mL   Screen Opiate/Opioid/Benzo Prescription Compliance    Collection Time: 01/05/24 10:53 AM   Result Value Ref Range    Creatinine, Urine Random 60.3 20.0 - 320.0 mg/dL    Amphetamine  Screen, Urine Presumptive Negative Presumptive Negative    Barbiturate Screen, Urine Presumptive Negative Presumptive Negative    Cannabinoid Screen, Urine Presumptive Negative Presumptive Negative    Cocaine Metabolite Screen, Urine Presumptive Negative Presumptive Negative    PCP Screen, Urine Presumptive Negative Presumptive Negative       Controlled Substance Agreement Date 24  I have reviewed each line item on the Controlled Substance Agreement including, but not limited to, the benefits, risks, and alternatives to treatment with a Controlled Substance medication(s). The patient has verbalized understanding and signed the agreement.    Opioids:  What is the patient's goal of therapy? Tx hip pain  Is this being achieved with current treatment? yes    I have calculated the patient's Morphine Dose Equivalent (MED):   I have considered referral to Pain Management and/or a specialist, and do not feel it is necessary at this time.    I feel that it is clinically indicated to continue this current medication regimen after consideration of alternative therapies, and other non-opioid treatment.    Opioid Risk Screening:  Family History of Substance Abuse  Alcohol: 0 (3/28/2024 12:00 PM)  Illegal Drugs: 0 (3/28/2024 12:00 PM)  Prescription Drugs: 0 (3/28/2024 12:00 PM)    Personal History of Substance Abuse  Alcohol: 0 (3/28/2024 12:00 PM)  Illegal Drugs: 0 (3/28/2024 12:00 PM)  Prescription Drugs: 0 (3/28/2024 12:00 PM)    Patient Age (16-45)  Age (16-45): 0 (3/28/2024 12:00 PM)    History of Preadolescent Sexual Abuse  History of Preadolescent Sexual Abuse: .0 (3/28/2024 12:00 PM)    Psychological Disease  Attention Deficit Disorder, Obsessive Compulsive Disorder, Bipolar, Schizophrenia: 0 (3/28/2024 12:00 PM)  Depression: 0 (3/28/2024 12:00 PM)    Total Score  Total Score: 0 (3/28/2024 12:00 PM)    Family History of Substance Abuse  Alcohol: 0 (2023  4:00 PM)  Illegal Dru (2023  4:00  PM)  Prescription Dru (2023  4:00 PM)    Personal History of Substance Abuse  Alcohol: 0 (2023  4:00 PM)  Illegal Drugs: 0 (2023  4:00 PM)  Prescription Drugs: 0 (2023  4:00 PM)    Patient Age (16-45)  Age (16-45): 0 (2023  4:00 PM)    History of Preadolescent Sexual Abuse  History of Preadolescent Sexual Abuse: 0 (2023  4:00 PM)    Psychological Disease  Attention Deficit Disorder, Obsessive Compulsive Disorder, Bipolar, Schizophrenia: 0 (2023  4:00 PM)  Depression: 0 (2023  4:00 PM)    Total Score  Total Score: 0 (2023  4:00 PM)    Total Score Risk Category  TOTAL SCORE CATEGORY: Low Risk (0-3) (3/28/2024 12:00 PM)        Pain Assessment:  Analgesia  What was your pain level on average during the past week?: 7  What was your pain level at its worst during the past week?: 8  What percentage of your pain has been relieved during the past week?: 70 %  Is the amount of pain relief you are now obtaining from your current pain relievers enough to make a real difference in your life?: Y  Query to Clinician: Is the patient's pain relief clinically significant?: Yes    Activities of Daily Living  Physical Functioning: Better  Family Relationships: Same  Social Relationships: Same  Mood: Better  Sleep Patterns: Better  Overall Functioning: Better    Adverse Events  Is patient experiencing any side effects from current pain relievers?: N  Patient's Overall Severity of Side Effects: None      Assessment  Is your overall impression that this patient is benefiting from opioid therapy?: Yes  Specific Analgesic Plan: Continue present regimen      Assessment and Plan:  Problem List Items Addressed This Visit          Medium    Arthritis    Overview     Uses Vicodin prn hip OA  Declines surgical intervention  Tolerates w/o adverse effects  Stable  Continue         Relevant Medications    HYDROcodone-acetaminophen (Norco) 5-325 mg tablet    Cervical disc disorder    Overview      Narrowing of the C4-C5, C5-C6, and C6-C7 disc spaces  On SNRI for neuropathic pain n past  On Vicodin  symptoms well managed, monitor         Relevant Medications    HYDROcodone-acetaminophen (Norco) 5-325 mg tablet    Medication management    Overview     CSA, UDS, OARRS, CSV [facility] compliant             Other Visit Diagnoses       Pain of toe of right foot    -  Primary    Likely neuroipathic  can try compounded pain med from [facility] or gabapentin at [facility], or both  R&B discussed  Can uptitrate if needed, may help w/sleep too    Relevant Medications    gabapentin (Neurontin) 100 mg capsule    Other Relevant Orders    XR toe right 2+ views    Referral to Podiatry            ROS otherwise negative aside from what was mentioned above in HPI.    Vitals  /74   Pulse 60   Temp 36.2 °C (97.2 °F) (Temporal)   Wt 60.1 kg (132 lb 9.6 oz)   SpO2 96%   BMI 23.12 kg/m²   Body mass index is 23.12 kg/m².  Physical Exam  Gen: Alert, NAD  Ext: Right 4th toe DIP joint deformed and tender  Neuro:  Gross motor and sensory intact to all modalities though temperature slighty decreased foot vs hand on right      Allergies and Medications  Carbamazepine, Morphine, Propoxyphene, and Niacin  Current Outpatient Medications   Medication Instructions    amLODIPine-benazepriL (Lotrel) 5-40 mg capsule 1 capsule, oral, Daily    amoxicillin (Amoxil) 500 mg capsule 4 CAPS (1) HOUR PRIOR TO APPT    ascorbic acid (VITAMIN C) 500 mg, oral, 2 times daily    aspirin 81 mg, oral, Daily    atorvastatin (LIPITOR) 80 mg, oral, Daily    cholecalciferol (VITAMIN D-3) 50,000 Units, oral, Weekly    cyanocobalamin (VITAMIN B-12) 1,000 mcg, oral, Daily, AS DIRECTED    fluticasone (Flonase) 50 mcg/actuation nasal spray 1 spray, Each Nostril, 2 times daily, Shake gently. Before first use, prime pump. After use, clean tip and replace cap.    gabapentin (NEURONTIN) 100 mg, oral, Nightly    HYDROcodone-acetaminophen (Norco) 5-325 mg tablet 1 tablet, oral, Every  8 hours PRN    multivitamin-min-iron-FA-vit K (Adults Multivitamin) 18 mg iron-400 mcg-25 mcg tablet 1 tablet, oral, Daily    naloxone (NARCAN) 4 mg, nasal, As needed, 4 mg (contents of 1 nasal spray) as a single dose in one nostril; may repeat every 2 to 3 minutes in alternating nostrils    nitroglycerin (NITROSTAT) 0.4 mg, sublingual, Every 5 min PRN    omega-3 (Fish Oil) 60- mg capsule 500 mg, oral, Daily    Prolia 60 mg, subcutaneous, Every 6 months    zolpidem (AMBIEN) 5 mg, oral, Nightly PRN   Controlled Substance Visit:  I have personally reviewed the OARRS report and have considered the risks of abuse, dependence, addiction and diversion and I believe that it is clinically appropriate for the patient to be prescribed this medication.    Is the patient prescribed a combination of a benzodiazepine and opioid? no     The last Urine Drug Screen has been reviewed and results are as expected,  and/or the UDS was ordered today if due.  Recent Results (from the past 8760 hour(s))   Confirmation Opiate/Opioid/Benzo Prescription Compliance    Collection Time: 01/05/24 10:53 AM   Result Value Ref Range    Clonazepam <25 <25 ng/mL    7-Aminoclonazepam <25 <25 ng/mL    Alprazolam <25 <25 ng/mL    Alpha-Hydroxyalprazolam <25 <25 ng/mL    Midazolam <25 <25 ng/mL    Alpha-Hydroxymidazolam <25 <25 ng/mL    Chlordiazepoxide <25 <25 ng/mL    Diazepam <25 <25 ng/mL    Nordiazepam <25 <25 ng/mL    Temazepam <25 <25 ng/mL    Oxazepam <25 <25 ng/mL    Lorazepam <25 <25 ng/mL    Methadone <25 <25 ng/mL    EDDP <25 <25 ng/mL    6-Acetylmorphine <25 <25 ng/mL    Codeine <50 <50 ng/mL    Hydrocodone 145 (H) <25 ng/mL    Hydromorphone 136 (H) <25 ng/mL    Morphine  <50 <50 ng/mL    Norhydrocodone 507 (H) <25 ng/mL    Noroxycodone <25 <25 ng/mL    Oxycodone <25 <25 ng/mL    Oxymorphone <25 <25 ng/mL    Fentanyl <2.5 <2.5 ng/mL    Norfentanyl <2.5 <2.5 ng/mL    Tramadol <50 <50 ng/mL    O-Desmethyltramadol <50 <50 ng/mL    Zolpidem  <25 <25 ng/mL    Zolpidem Metabolite (ZCA) 631 (H) <25 ng/mL   Screen Opiate/Opioid/Benzo Prescription Compliance    Collection Time: 01/05/24 10:53 AM   Result Value Ref Range    Creatinine, Urine Random 60.3 20.0 - 320.0 mg/dL    Amphetamine Screen, Urine Presumptive Negative Presumptive Negative    Barbiturate Screen, Urine Presumptive Negative Presumptive Negative    Cannabinoid Screen, Urine Presumptive Negative Presumptive Negative    Cocaine Metabolite Screen, Urine Presumptive Negative Presumptive Negative    PCP Screen, Urine Presumptive Negative Presumptive Negative       Controlled Substance Agreement Date 1/5/24  I have reviewed each line item on the Controlled Substance Agreement including, but not limited to, the benefits, risks, and alternatives to treatment with a Controlled Substance medication(s). The patient has verbalized understanding and signed the agreement.    Opioids:  What is the patient's goal of therapy? Pain control  Is this being achieved with current treatment? yes    I have calculated the patient's Morphine Dose Equivalent (MED):   I have considered referral to Pain Management and/or a specialist, and do not feel it is necessary at this time.    I feel that it is clinically indicated to continue this current medication regimen after consideration of alternative therapies, and other non-opioid treatment.    Opioid Risk Screening:  Family History of Substance Abuse  Alcohol: 0 (3/28/2024 12:00 PM)  Illegal Drugs: 0 (3/28/2024 12:00 PM)  Prescription Drugs: 0 (3/28/2024 12:00 PM)    Personal History of Substance Abuse  Alcohol: 0 (3/28/2024 12:00 PM)  Illegal Drugs: 0 (3/28/2024 12:00 PM)  Prescription Drugs: 0 (3/28/2024 12:00 PM)    Patient Age (16-45)  Age (16-45): 0 (3/28/2024 12:00 PM)    History of Preadolescent Sexual Abuse  History of Preadolescent Sexual Abuse: .0 (3/28/2024 12:00 PM)    Psychological Disease  Attention Deficit Disorder, Obsessive Compulsive Disorder, Bipolar,  Schizophrenia: 0 (3/28/2024 12:00 PM)  Depression: 0 (3/28/2024 12:00 PM)    Total Score  Total Score: 0 (3/28/2024 12:00 PM)    Family History of Substance Abuse  Alcohol: 0 (2023  4:00 PM)  Illegal Dru (2023  4:00 PM)  Prescription Dru (2023  4:00 PM)    Personal History of Substance Abuse  Alcohol: 0 (2023  4:00 PM)  Illegal Drugs: 0 (2023  4:00 PM)  Prescription Drugs: 0 (2023  4:00 PM)    Patient Age (16-45)  Age (16-45): 0 (2023  4:00 PM)    History of Preadolescent Sexual Abuse  History of Preadolescent Sexual Abuse: 0 (2023  4:00 PM)    Psychological Disease  Attention Deficit Disorder, Obsessive Compulsive Disorder, Bipolar, Schizophrenia: 0 (2023  4:00 PM)  Depression: 0 (2023  4:00 PM)    Total Score  Total Score: 0 (2023  4:00 PM)    Total Score Risk Category  TOTAL SCORE CATEGORY: Low Risk (0-3) (3/28/2024 12:00 PM)        Pain Assessment:  Analgesia  What was your pain level on average during the past week?: 7  What was your pain level at its worst during the past week?: 8  What percentage of your pain has been relieved during the past week?: 70 %  Is the amount of pain relief you are now obtaining from your current pain relievers enough to make a real difference in your life?: Y  Query to Clinician: Is the patient's pain relief clinically significant?: Yes    Activities of Daily Living  Physical Functioning: Better  Family Relationships: Same  Social Relationships: Same  Mood: Better  Sleep Patterns: Better  Overall Functioning: Better    Adverse Events  Is patient experiencing any side effects from current pain relievers?: N  Patient's Overall Severity of Side Effects: None      Assessment  Is your overall impression that this patient is benefiting from opioid therapy?: Yes  Specific Analgesic Plan: Continue present regimen

## 2024-04-01 LAB — NONINV COLON CA DNA+OCC BLD SCRN STL QL: NEGATIVE

## 2024-05-09 DIAGNOSIS — M50.90 CERVICAL DISC DISORDER: ICD-10-CM

## 2024-05-09 DIAGNOSIS — M19.90 ARTHRITIS: ICD-10-CM

## 2024-05-09 RX ORDER — HYDROCODONE BITARTRATE AND ACETAMINOPHEN 5; 325 MG/1; MG/1
1 TABLET ORAL EVERY 8 HOURS PRN
Qty: 90 TABLET | Refills: 0 | Status: SHIPPED | OUTPATIENT
Start: 2024-05-09

## 2024-05-31 ENCOUNTER — TELEPHONE (OUTPATIENT)
Dept: PRIMARY CARE | Facility: CLINIC | Age: 79
End: 2024-05-31
Payer: MEDICARE

## 2024-05-31 NOTE — TELEPHONE ENCOUNTER
Patient is still on DG schedule for next Friday. She is rescheduled for 7/5 but I can not remove it from next weeks schedule.

## 2024-06-05 DIAGNOSIS — F51.04 CHRONIC INSOMNIA: ICD-10-CM

## 2024-06-05 RX ORDER — ZOLPIDEM TARTRATE 5 MG/1
5 TABLET ORAL NIGHTLY PRN
Qty: 90 TABLET | Refills: 0 | Status: SHIPPED | OUTPATIENT
Start: 2024-06-05

## 2024-06-14 DIAGNOSIS — M50.90 CERVICAL DISC DISORDER: ICD-10-CM

## 2024-06-14 DIAGNOSIS — M19.90 ARTHRITIS: ICD-10-CM

## 2024-06-14 RX ORDER — HYDROCODONE BITARTRATE AND ACETAMINOPHEN 5; 325 MG/1; MG/1
1 TABLET ORAL EVERY 8 HOURS PRN
Qty: 90 TABLET | Refills: 0 | Status: SHIPPED | OUTPATIENT
Start: 2024-06-14

## 2024-07-05 ENCOUNTER — APPOINTMENT (OUTPATIENT)
Dept: PRIMARY CARE | Facility: CLINIC | Age: 79
End: 2024-07-05
Payer: MEDICARE

## 2024-07-05 VITALS
WEIGHT: 131.2 LBS | TEMPERATURE: 97.5 F | BODY MASS INDEX: 22.88 KG/M2 | OXYGEN SATURATION: 97 % | HEART RATE: 63 BPM | SYSTOLIC BLOOD PRESSURE: 103 MMHG | DIASTOLIC BLOOD PRESSURE: 67 MMHG

## 2024-07-05 DIAGNOSIS — M85.9 DISORDER OF BONE DENSITY AND STRUCTURE, UNSPECIFIED: ICD-10-CM

## 2024-07-05 DIAGNOSIS — M19.90 ARTHRITIS: ICD-10-CM

## 2024-07-05 DIAGNOSIS — Z00.00 ROUTINE ADULT HEALTH MAINTENANCE: ICD-10-CM

## 2024-07-05 DIAGNOSIS — M50.90 CERVICAL DISC DISORDER: ICD-10-CM

## 2024-07-05 DIAGNOSIS — E53.8 VITAMIN B12 DEFICIENCY: ICD-10-CM

## 2024-07-05 DIAGNOSIS — Z51.81 ENCOUNTER FOR MONITORING DENOSUMAB THERAPY: ICD-10-CM

## 2024-07-05 DIAGNOSIS — M81.0 AGE-RELATED OSTEOPOROSIS WITHOUT CURRENT PATHOLOGICAL FRACTURE: Primary | ICD-10-CM

## 2024-07-05 DIAGNOSIS — Z79.899 MEDICATION MANAGEMENT: ICD-10-CM

## 2024-07-05 DIAGNOSIS — Z79.899 ENCOUNTER FOR MONITORING DENOSUMAB THERAPY: ICD-10-CM

## 2024-07-05 DIAGNOSIS — I10 ESSENTIAL HYPERTENSION: ICD-10-CM

## 2024-07-05 DIAGNOSIS — E55.9 VITAMIN D DEFICIENCY: ICD-10-CM

## 2024-07-05 DIAGNOSIS — Z79.891 LONG TERM (CURRENT) USE OF OPIATE ANALGESIC: ICD-10-CM

## 2024-07-05 DIAGNOSIS — N20.0 KIDNEY STONES: ICD-10-CM

## 2024-07-05 PROCEDURE — 1159F MED LIST DOCD IN RCRD: CPT | Performed by: NURSE PRACTITIONER

## 2024-07-05 PROCEDURE — 1123F ACP DISCUSS/DSCN MKR DOCD: CPT | Performed by: NURSE PRACTITIONER

## 2024-07-05 PROCEDURE — 3078F DIAST BP <80 MM HG: CPT | Performed by: NURSE PRACTITIONER

## 2024-07-05 PROCEDURE — 96372 THER/PROPH/DIAG INJ SC/IM: CPT | Performed by: NURSE PRACTITIONER

## 2024-07-05 PROCEDURE — 3074F SYST BP LT 130 MM HG: CPT | Performed by: NURSE PRACTITIONER

## 2024-07-05 PROCEDURE — 99214 OFFICE O/P EST MOD 30 MIN: CPT | Performed by: NURSE PRACTITIONER

## 2024-07-05 PROCEDURE — 1160F RVW MEDS BY RX/DR IN RCRD: CPT | Performed by: NURSE PRACTITIONER

## 2024-07-05 PROCEDURE — 1036F TOBACCO NON-USER: CPT | Performed by: NURSE PRACTITIONER

## 2024-07-05 RX ORDER — HYDROCODONE BITARTRATE AND ACETAMINOPHEN 5; 325 MG/1; MG/1
1 TABLET ORAL EVERY 8 HOURS PRN
Qty: 90 TABLET | Refills: 0 | Status: SHIPPED | OUTPATIENT
Start: 2024-07-05

## 2024-07-05 NOTE — PROGRESS NOTES
Problem List Items Addressed This Visit       Age-related osteoporosis without current pathological fracture - Primary    Overview     Prolia started 10/12  BMD 4/28/22: T score -5.0 (forearm radius)         Current Assessment & Plan     Due for DEXA and labs         Relevant Orders    Comprehensive Metabolic Panel    XR DEXA bone density    Arthritis    Overview     Uses Vicodin prn hip OA  Declines surgical intervention  Tolerates w/o adverse effects  Stable  Continue         Relevant Medications    HYDROcodone-acetaminophen (Norco) 5-325 mg tablet    Cervical disc disorder    Overview     Narrowing of the C4-C5, C5-C6, and C6-C7 disc spaces  On SNRI for neuropathic pain n past  On Vicodin  symptoms well managed, monitor         Relevant Medications    HYDROcodone-acetaminophen (Norco) 5-325 mg tablet    Disorder of bone density and structure, unspecified    Overview     Prolia          Current Assessment & Plan     Prolia given today  Get labs done         Encounter for monitoring denosumab therapy    Overview     Prolia 1/5/24; 7/5/24 1/8/24: see encounter from [facility]: Spoke with Belle at the dentist office.  Relayed message that patient had a prolia injection on 1/5/24.  She stated she only has a cleaning scheduled as of right now.  She will let the dentist know and if they have any further questions they will call us back.         Essential hypertension    Overview     Goal <130/80  controlled with CCB/ACEi  9/27/22 Cuff Comparison  Home cuff - 155/73 HR 63  Office cuff - 152/72 HR 61  Manual cuff - 152/76;         Relevant Orders    CBC and Auto Differential    Comprehensive Metabolic Panel    TSH with reflex to Free T4 if abnormal    Lipid Panel    Urinalysis with Reflex Culture and Microscopic    Kidney stones    Relevant Orders    Comprehensive Metabolic Panel    Urinalysis with Reflex Culture and Microscopic    Long term (current) use of opiate analgesic    Overview     narcan         Medication  management    Overview     CSA, UDS, OARRS, CSV  compliant            Routine adult health maintenance    Overview     Influenza Seasonal - High Dose - Age 65+ 10/1/19, 9/5/20, 10/1/21, 10/13/22  Moderna COVID 19 vaccine 3/17/21, 4/14/21, 11/7/21, 7/18/22    Pneumococcal-13 Vac Conjugate 5/5/2015   Pneumovax 8/18/2009, 6/23/20   Tdap (Age 7+)3/24/2006   Tetanus Diphtheria Booster (Age >7) Pres Free 5/9/2016   Dtxyynqw15/30/2008  Shingrix 9/25/20, 12/1/20  Cologuard 3/5/18 (-); 3/2021 (-); 4/1/24 (-)  BMD 6/16; 7/20, 4/28/22  Mamm 12/17, 2/3/20, 3/12/21, 6/1/23 6/30/22: Current 10-Year ASCVD Risk:  32.58% - High Risk         Current Assessment & Plan     Will get fasting labs before she sees Dr Bach           Relevant Orders    CBC and Auto Differential    Comprehensive Metabolic Panel    TSH with reflex to Free T4 if abnormal    Lipid Panel    Urinalysis with Reflex Culture and Microscopic    Vitamin B12 deficiency    Overview     0/19 level was 260  12/2020 = 406  on PO supp daily         Relevant Orders    CBC and Auto Differential    Vitamin B12    Vitamin D deficiency    Overview     Comment on above: 12/2020 = 32.4Goal 40-50On 5K daily supplement;  12/2020 = 32.4, 6/21: 34Goal 40-50On 5K daily supplement;         Relevant Orders    Vitamin D 25-Hydroxy,Total (for eval of Vitamin D levels)      Pt here today for prolia injection  Denies recent dental work  Last Labs:   Calcium = 8.9 NL 9/2023   Vitamin D = 71.1 5/2023   Advised to continue Calcium/Vitamin D supplementation  Avoid invasive dental work    Controlled Substance Visit:  I have personally reviewed the OARRS report and have considered the risks of abuse, dependence, addiction and diversion and I believe that it is clinically appropriate for the patient to be prescribed this medication.    Is the patient prescribed a combination of a benzodiazepine and opioid?  No    Last Urine Drug Screen / ordered today: No  Recent Results (from the past 4705  hour(s))   Confirmation Opiate/Opioid/Benzo Prescription Compliance    Collection Time: 01/05/24 10:53 AM   Result Value Ref Range    Clonazepam <25 <25 ng/mL    7-Aminoclonazepam <25 <25 ng/mL    Alprazolam <25 <25 ng/mL    Alpha-Hydroxyalprazolam <25 <25 ng/mL    Midazolam <25 <25 ng/mL    Alpha-Hydroxymidazolam <25 <25 ng/mL    Chlordiazepoxide <25 <25 ng/mL    Diazepam <25 <25 ng/mL    Nordiazepam <25 <25 ng/mL    Temazepam <25 <25 ng/mL    Oxazepam <25 <25 ng/mL    Lorazepam <25 <25 ng/mL    Methadone <25 <25 ng/mL    EDDP <25 <25 ng/mL    6-Acetylmorphine <25 <25 ng/mL    Codeine <50 <50 ng/mL    Hydrocodone 145 (H) <25 ng/mL    Hydromorphone 136 (H) <25 ng/mL    Morphine  <50 <50 ng/mL    Norhydrocodone 507 (H) <25 ng/mL    Noroxycodone <25 <25 ng/mL    Oxycodone <25 <25 ng/mL    Oxymorphone <25 <25 ng/mL    Fentanyl <2.5 <2.5 ng/mL    Norfentanyl <2.5 <2.5 ng/mL    Tramadol <50 <50 ng/mL    O-Desmethyltramadol <50 <50 ng/mL    Zolpidem <25 <25 ng/mL    Zolpidem Metabolite (ZCA) 631 (H) <25 ng/mL   Screen Opiate/Opioid/Benzo Prescription Compliance    Collection Time: 01/05/24 10:53 AM   Result Value Ref Range    Creatinine, Urine Random 60.3 20.0 - 320.0 mg/dL    Amphetamine Screen, Urine Presumptive Negative Presumptive Negative    Barbiturate Screen, Urine Presumptive Negative Presumptive Negative    Cannabinoid Screen, Urine Presumptive Negative Presumptive Negative    Cocaine Metabolite Screen, Urine Presumptive Negative Presumptive Negative    PCP Screen, Urine Presumptive Negative Presumptive Negative     Results are as expected.     Controlled Substance Agreement:  Date of the Last Agreement: 1/5/24  I have reviewed each line item on the Controlled Substance Agreement including, but not limited to, the benefits, risks, and alternatives to treatment with a Controlled Substance medication(s). The patient has verbalized understanding and signed the agreement.    Opioids:  What is the patient's goal of  therapy? Pain management  Is this being achieved with current treatment? Yes     I have calculated the patient's Morphine Dose Equivalent (MED): 15 mme/day   I have considered referral to Pain Management and/or a specialist, and do not feel it is necessary at this time.    I feel that it is clinically indicated to continue this current medication regimen after consideration of alternative therapies, and other non-opioid treatment.    Opioid Risk Screening:  Family History of Substance Abuse  Alcohol: 0 (3/28/2024 12:00 PM)  Illegal Drugs: 0 (3/28/2024 12:00 PM)  Prescription Drugs: 0 (3/28/2024 12:00 PM)    Personal History of Substance Abuse  Alcohol: 0 (3/28/2024 12:00 PM)  Illegal Drugs: 0 (3/28/2024 12:00 PM)  Prescription Drugs: 0 (3/28/2024 12:00 PM)    Patient Age (16-45)  Age (16-45): 0 (3/28/2024 12:00 PM)    History of Preadolescent Sexual Abuse  History of Preadolescent Sexual Abuse: .0 (3/28/2024 12:00 PM)    Psychological Disease  Attention Deficit Disorder, Obsessive Compulsive Disorder, Bipolar, Schizophrenia: 0 (3/28/2024 12:00 PM)  Depression: 0 (3/28/2024 12:00 PM)    Total Score  Total Score: 0 (3/28/2024 12:00 PM)    Total Score Risk Category  TOTAL SCORE CATEGORY: Low Risk (0-3) (3/28/2024 12:00 PM)        Pain Assessment:  Analgesia  What was your pain level on average during the past week?: 6  What was your pain level at its worst during the past week?: 9  What percentage of your pain has been relieved during the past week?: 60 %  Is the amount of pain relief you are now obtaining from your current pain relievers enough to make a real difference in your life?: Y    Activities of Daily Living  Physical Functioning: Same  Family Relationships: Same  Social Relationships: Same  Mood: Same  Sleep Patterns: Same  Overall Functioning: Same        Gen: Alert, NAD  HEENT:  PERRLA, EOMI, conjunctiva and sclera normal in appearance; Neck supple  Respiratory:  Lungs CTAB  Cardiovascular:  Heart RRR. No  M/R/G  Neuro:  Gross motor and sensory intact  Skin:  No suspicious lesions present   Mood: normal

## 2024-07-23 ENCOUNTER — APPOINTMENT (OUTPATIENT)
Dept: PRIMARY CARE | Facility: CLINIC | Age: 79
End: 2024-07-23
Payer: MEDICARE

## 2024-07-23 VITALS
SYSTOLIC BLOOD PRESSURE: 100 MMHG | OXYGEN SATURATION: 95 % | DIASTOLIC BLOOD PRESSURE: 64 MMHG | BODY MASS INDEX: 23.48 KG/M2 | TEMPERATURE: 98.3 F | WEIGHT: 132.5 LBS | HEIGHT: 63 IN | HEART RATE: 71 BPM

## 2024-07-23 DIAGNOSIS — E55.9 VITAMIN D DEFICIENCY: ICD-10-CM

## 2024-07-23 DIAGNOSIS — I65.29 STENOSIS OF CAROTID ARTERY, UNSPECIFIED LATERALITY: ICD-10-CM

## 2024-07-23 DIAGNOSIS — I77.819 DILATION OF AORTA (CMS-HCC): ICD-10-CM

## 2024-07-23 DIAGNOSIS — M19.90 ARTHRITIS: ICD-10-CM

## 2024-07-23 DIAGNOSIS — Z13.9 ENCOUNTER FOR SCREENING INVOLVING SOCIAL DETERMINANTS OF HEALTH (SDOH): ICD-10-CM

## 2024-07-23 DIAGNOSIS — Z12.31 ENCOUNTER FOR SCREENING MAMMOGRAM FOR BREAST CANCER: ICD-10-CM

## 2024-07-23 DIAGNOSIS — Z79.899 MEDICATION MANAGEMENT: ICD-10-CM

## 2024-07-23 DIAGNOSIS — Z71.89 CARDIAC RISK COUNSELING: ICD-10-CM

## 2024-07-23 DIAGNOSIS — Z00.00 ROUTINE ADULT HEALTH MAINTENANCE: Primary | ICD-10-CM

## 2024-07-23 DIAGNOSIS — E53.8 VITAMIN B12 DEFICIENCY: ICD-10-CM

## 2024-07-23 DIAGNOSIS — M81.0 AGE-RELATED OSTEOPOROSIS WITHOUT CURRENT PATHOLOGICAL FRACTURE: ICD-10-CM

## 2024-07-23 DIAGNOSIS — M79.674 PAIN OF TOE OF RIGHT FOOT: ICD-10-CM

## 2024-07-23 DIAGNOSIS — Z13.39 ALCOHOL SCREENING: ICD-10-CM

## 2024-07-23 DIAGNOSIS — F51.04 CHRONIC INSOMNIA: ICD-10-CM

## 2024-07-23 DIAGNOSIS — E78.2 MIXED HYPERLIPIDEMIA: ICD-10-CM

## 2024-07-23 DIAGNOSIS — I10 ESSENTIAL HYPERTENSION: ICD-10-CM

## 2024-07-23 DIAGNOSIS — Z01.419 ENCOUNTER FOR GYNECOLOGICAL EXAMINATION WITHOUT ABNORMAL FINDING: ICD-10-CM

## 2024-07-23 DIAGNOSIS — Z13.89 SCREENING FOR MULTIPLE CONDITIONS: ICD-10-CM

## 2024-07-23 DIAGNOSIS — I25.10 CORONARY ARTERY DISEASE INVOLVING NATIVE CORONARY ARTERY OF NATIVE HEART WITHOUT ANGINA PECTORIS: ICD-10-CM

## 2024-07-23 DIAGNOSIS — Z71.89 ADVANCED DIRECTIVES, COUNSELING/DISCUSSION: ICD-10-CM

## 2024-07-23 PROCEDURE — G0439 PPPS, SUBSEQ VISIT: HCPCS | Performed by: INTERNAL MEDICINE

## 2024-07-23 PROCEDURE — 99214 OFFICE O/P EST MOD 30 MIN: CPT | Performed by: INTERNAL MEDICINE

## 2024-07-23 PROCEDURE — G0444 DEPRESSION SCREEN ANNUAL: HCPCS | Performed by: INTERNAL MEDICINE

## 2024-07-23 PROCEDURE — 99497 ADVNCD CARE PLAN 30 MIN: CPT | Performed by: INTERNAL MEDICINE

## 2024-07-23 PROCEDURE — 1036F TOBACCO NON-USER: CPT | Performed by: INTERNAL MEDICINE

## 2024-07-23 PROCEDURE — 99397 PER PM REEVAL EST PAT 65+ YR: CPT | Performed by: INTERNAL MEDICINE

## 2024-07-23 PROCEDURE — 3078F DIAST BP <80 MM HG: CPT | Performed by: INTERNAL MEDICINE

## 2024-07-23 PROCEDURE — 1158F ADVNC CARE PLAN TLK DOCD: CPT | Performed by: INTERNAL MEDICINE

## 2024-07-23 PROCEDURE — G0446 INTENS BEHAVE THER CARDIO DX: HCPCS | Performed by: INTERNAL MEDICINE

## 2024-07-23 PROCEDURE — 3074F SYST BP LT 130 MM HG: CPT | Performed by: INTERNAL MEDICINE

## 2024-07-23 PROCEDURE — 1160F RVW MEDS BY RX/DR IN RCRD: CPT | Performed by: INTERNAL MEDICINE

## 2024-07-23 PROCEDURE — G0101 CA SCREEN;PELVIC/BREAST EXAM: HCPCS | Performed by: INTERNAL MEDICINE

## 2024-07-23 PROCEDURE — 1123F ACP DISCUSS/DSCN MKR DOCD: CPT | Performed by: INTERNAL MEDICINE

## 2024-07-23 PROCEDURE — 1159F MED LIST DOCD IN RCRD: CPT | Performed by: INTERNAL MEDICINE

## 2024-07-23 RX ORDER — ZOLPIDEM TARTRATE 10 MG/1
10 TABLET ORAL NIGHTLY PRN
Qty: 90 TABLET | Refills: 0 | Status: SHIPPED | OUTPATIENT
Start: 2024-07-23 | End: 2024-10-21

## 2024-07-23 RX ORDER — GABAPENTIN 100 MG/1
100 CAPSULE ORAL 3 TIMES DAILY
Qty: 270 CAPSULE | Refills: 3 | Status: SHIPPED | OUTPATIENT
Start: 2024-07-23 | End: 2025-07-23

## 2024-07-23 SDOH — ECONOMIC STABILITY: FOOD INSECURITY: WITHIN THE PAST 12 MONTHS, THE FOOD YOU BOUGHT JUST DIDN'T LAST AND YOU DIDN'T HAVE MONEY TO GET MORE.: NEVER TRUE

## 2024-07-23 SDOH — ECONOMIC STABILITY: TRANSPORTATION INSECURITY
IN THE PAST 12 MONTHS, HAS LACK OF TRANSPORTATION KEPT YOU FROM MEETINGS, WORK, OR FROM GETTING THINGS NEEDED FOR DAILY LIVING?: NO

## 2024-07-23 SDOH — ECONOMIC STABILITY: FOOD INSECURITY: WITHIN THE PAST 12 MONTHS, YOU WORRIED THAT YOUR FOOD WOULD RUN OUT BEFORE YOU GOT MONEY TO BUY MORE.: NEVER TRUE

## 2024-07-23 SDOH — ECONOMIC STABILITY: TRANSPORTATION INSECURITY
IN THE PAST 12 MONTHS, HAS THE LACK OF TRANSPORTATION KEPT YOU FROM MEDICAL APPOINTMENTS OR FROM GETTING MEDICATIONS?: NO

## 2024-07-23 SDOH — ECONOMIC STABILITY: INCOME INSECURITY: IN THE LAST 12 MONTHS, WAS THERE A TIME WHEN YOU WERE NOT ABLE TO PAY THE MORTGAGE OR RENT ON TIME?: NO

## 2024-07-23 ASSESSMENT — SOCIAL DETERMINANTS OF HEALTH (SDOH): IN THE PAST 12 MONTHS, HAS THE ELECTRIC, GAS, OIL, OR WATER COMPANY THREATENED TO SHUT OFF SERVICE IN YOUR HOME?: NO

## 2024-07-23 NOTE — ASSESSMENT & PLAN NOTE
Annual Wellness exam completed   Preventive Health History reviewed  Ordered:   Labs    Mammogram

## 2024-07-23 NOTE — PROGRESS NOTES
Annual Medicare Wellness Exam/Comprehensive Problem Focused Follow Up  HPI/CC  Chief Complaint   Patient presents with    Medicare Annual Wellness Visit Subsequent     Saw Cardiology in April, no changes made (reviewed)  ASSESSMENT/PLAN:  1. Mixed hyperlipidemia - ICD9: 272.2, ICD10: E78.2 (primary diagnosis)  Pending labs per PCP  Continue statin as is    2. Coronary artery disease of native artery of native heart with stable angina pectoris (HCC) - ICD9: 414.01, 413.9, ICD10: I25.118  No symptoms  Symptom followup go rnow   6 mos     Michael XR toe in 3/24:  IMPRESSION:  Status post right 4th proximal interphalangeal joint arthroplasty  without evidence gross complication.  Seeiing Podiatry  Using topical pain med    Hasn't yet done labs  Had Prolia in 7/5/24  Reviewed DG note  CSV also done then    Assessment and Plan:  Problem List Items Addressed This Visit          High    Routine adult health maintenance - Primary    Overview     Influenza Seasonal - High Dose - Age 65+ 10/1/19, 9/5/20, 10/1/21, 10/13/22, 9/2/23  Moderna COVID 19 vaccine 3/17/21, 4/14/21, 11/7/21, 7/18/22, 10/11/23  Pneumococcal-13 Vac Conjugate 5/5/2015   Pneumovax 8/18/2009, 6/23/20   Shingrix 9/25/20, 12/1/20  Tetanus Diphtheria Booster (Age >7) Pres Free 5/9/2016   Abiejjky84/30/2008  Tdap (Age 7+)3/24/2006  Cologuard 3/5/18 (-); 3/2021 (-); 4/1/24 (-)  BMD 6/16; 7/20, 4/28/22, 7/23/24  Mamm 12/17, 2/3/20, 3/12/21, 6/1/23 6/30/22: Current 10-Year ASCVD Risk:  32.58% - High Risk         Current Assessment & Plan     Annual Wellness exam completed   Preventive Health History reviewed  Ordered:   Labs    Mammogram               Medium    Advanced directives, counseling/discussion    Overview     7/23/24: Son Amish Luong is her HCPOA  FULL CODE           Age-related osteoporosis without current pathological fracture    Overview     Prolia started 10/12  BMD 4/28/22: T score -5.0 (forearm radius)  Last injection 7/5/24         Relevant Orders     N-Telopeptide, Urine    Basic metabolic panel    Comprehensive Metabolic Panel    Alcohol screening    Overview     07/23/24: 5 minutes spent screening for alcohol misuse  See snapshot (social documentation) for details.           Arthritis    Overview     Uses Vicodin prn hip OA  Also has foot/toe pain: s/p right 4th proximal interphalangeal joint arthroplasty  Declines surgical intervention  Tolerates w/o adverse effects  Stable  Continue         Current Assessment & Plan     Can use Gabapentin TID prn         Cardiac risk counseling    Overview     7/23/24 Current 10-Year ASCVD Risk:  24.95% - High Risk    on ASA (CAD)         Chronic insomnia    Overview     On Ambien (and also on Gabapentin)  Tolerates well, no AE         Current Assessment & Plan     Increase ambien to 10mg if toelrates         Relevant Medications    zolpidem (Ambien) 10 mg tablet    Coronary artery disease involving native coronary artery of native heart without angina pectoris    Overview     Regional abnormalities in the RCA territory on ECHO;   LV function is normal with severe hypokinesis of inferior wall  S/p LAD stents x2.   Chronically occluded RCA that fills via left-to-right collaterals.   Occluded major diagonal, which also fills via left-to-left collaterals.  PLavix stopped 9/22  Sees [facility]  Medically managed with statin, ASA           Dilation of aorta (CMS-HCC)    Overview     ECHO 5/22: Aorta dilated at 4.0cm at Sinus   On statin, ASA and ACEi/CCB  sees [facility]  Monitor         Encounter for gynecological examination without abnormal finding    Encounter for screening involving social determinants of health (SDoH)    Overview     07/23/24: 5 minutes spent on SDOH screening.  Specifically: Housing, Food Insecurity, Utilities and Transportatin Needs were evaluated   (See Screenings in Rooming section for documentation)           Encounter for screening mammogram for breast cancer    Relevant Orders    BI mammo  "bilateral screening tomosynthesis    Essential hypertension    Overview     Goal <130/80  controlled with CCB/ACEi ([place])  9/27/22 Cuff Comparison  Home cuff - 155/73 HR 63  Office cuff - 152/72 HR 61  Manual cuff - 152/76;         Relevant Orders    Albumin-Creatinine Ratio, Urine Random    CBC and Auto Differential    Urinalysis with Reflex Culture and Microscopic    Albumin-Creatinine Ratio, Urine Random    Medication management    Overview     CSA, UDS, OARRS, CSV [facility] compliant            Mixed hyperlipidemia    Overview     Goal LDL <100.  On statin & fish oil         Relevant Orders    TSH with reflex to Free T4 if abnormal    Lipid Panel    Screening for multiple conditions    Overview     Depression screening completed           Stenosis of carotid artery    Overview      20-39% stenosis.  On statin and ASA         Vitamin B12 deficiency    Overview     0/19 level was 260  12/2020 = 406  on PO supp daily         Relevant Orders    Vitamin B12    Vitamin D deficiency    Overview     12/2020 = 32.4  Goal 30-40 (hx kidney stones)  On 5K daily supplement;         Relevant Orders    Vitamin D 25-Hydroxy,Total (for eval of Vitamin D levels)    Vitamin D 25-Hydroxy,Total (for eval of Vitamin D levels)     Other Visit Diagnoses       Pain of toe of right foot        Likely neuroipathic  Topical pain medication  On Gabapentin, Can increase to TID prn  Can uptitrate if needed, may help w/sleep too    Relevant Medications    gabapentin (Neurontin) 100 mg capsule          ROS otherwise negative aside from what was mentioned above in HPI.    Vitals  /64   Pulse 71   Temp 36.8 °C (98.3 °F)   Ht 1.6 m (5' 3\")   Wt 60.1 kg (132 lb 8 oz)   SpO2 95%   BMI 23.47 kg/m²   Body mass index is 23.47 kg/m².  Physical Exam  Gen: Alert, NAD  HEENT:  Unremarkable  Neck:  No BROOKE  Respiratory:  Lungs CTAB  Cardiovascular:  Heart RRR  Neuro:  Gross motor and sensory intact  Skin:  No suspicious lesions present  Breast: No " masses, or axillary lymphadenopathy  Gyn: Normal pelvic exam: no uterine masses or cervical lesions, or CMT; no vaginal D/C. No ovarian or adnexal masses; No external vaginal or anal/perineal lesions (Pt declined chaperone)    Patient Care Team:  Thea Bach MD as PCP - General  Thea Bach MD as PCP - [employer] Advantage PCP  Albert Valentino Chan, MD as Referring Physician (Cardiology)  Anabela Kinney DPM as Referring Physician (Podiatry)     Activities of Daily Living  In your present state of health, do you have any difficulty performing the following activities?:   Preparing food and eating?: No  Bathing yourself: No  Getting dressed: No  Using the toilet:No  Moving around from place to place: No  In the past year have you fallen or had a near fall?:No  Able to manage finances independently: Yes  Able to perform grocery shopping: Yes  Able to manage medications independently: Yes  Able to do housework independently: Yes  Patient self-assessment of health status? Good    Current exercise habits: daily life activities   Dietary issues discussed: Yes  Hearing difficulties: No  Safe in current home environment: Yes  Visual Acuity assessed: No  Cognitive Impairment No    5 minutes spent on SDOH screening:   Specifically Housing, Food Insecurity, Utilities and Transportatin Needs were evaluated   (See Screenings in Rooming section for documentation)  Food Insecurity: No Food Insecurity (7/23/2024)    Hunger Vital Sign     Worried About Running Out of Food in the Last Year: Never true     Ran Out of Food in the Last Year: Never true     Housing Stability: Unknown (7/23/2024)    Housing Stability Vital Sign     Unable to Pay for Housing in the Last Year: No     Number of Times Moved in the Last Year: Not on file     Homeless in the Last Year: No     Transportation Needs: No Transportation Needs (7/23/2024)    PRAPARE - Transportation     Lack of Transportation (Medical): No     Lack of Transportation  (Non-Medical): No       Allergies and Medications  Carbamazepine, Morphine, Propoxyphene, and Niacin  Current Outpatient Medications   Medication Instructions    amLODIPine-benazepriL (Lotrel) 5-40 mg capsule 1 capsule, oral, Daily    amoxicillin (Amoxil) 500 mg capsule 4 CAPS (1) HOUR PRIOR TO APPT    ascorbic acid (VITAMIN C) 500 mg, oral, 2 times daily    aspirin 81 mg, oral, Daily    atorvastatin (LIPITOR) 80 mg, oral, Daily    cholecalciferol (VITAMIN D-3) 50,000 Units, oral, Once Weekly    cyanocobalamin (VITAMIN B-12) 1,000 mcg, oral, Daily, AS DIRECTED    fluticasone (Flonase) 50 mcg/actuation nasal spray 1 spray, Each Nostril, 2 times daily, Shake gently. Before first use, prime pump. After use, clean tip and replace cap.    gabapentin (NEURONTIN) 100 mg, oral, 3 times daily    HYDROcodone-acetaminophen (Norco) 5-325 mg tablet 1 tablet, oral, Every 8 hours PRN    multivitamin-min-iron-FA-vit K (Adults Multivitamin) 18 mg iron-400 mcg-25 mcg tablet 1 tablet, oral, Daily    naloxone (NARCAN) 4 mg, nasal, As needed, 4 mg (contents of 1 nasal spray) as a single dose in one nostril; may repeat every 2 to 3 minutes in alternating nostrils    nitroglycerin (NITROSTAT) 0.4 mg, sublingual, Every 5 min PRN    omega-3 (Fish Oil) 60- mg capsule 500 mg, oral, Daily    Prolia 60 mg, subcutaneous, Every 6 months    zolpidem (AMBIEN) 10 mg, oral, Nightly PRN       Medications and Supplements  prescribed by me and other practitioners or clinical pharmacist (such as prescriptions, OTC's, herbal therapies and supplements) were reviewed and documented in the medical record.      Active Problem List  Patient Active Problem List   Diagnosis    Age-related osteoporosis without current pathological fracture    Arthritis    Cervical disc disorder    Chronic insomnia    Coronary artery disease involving native coronary artery of native heart without angina pectoris    Diastolic dysfunction, left ventricle    Dilation of aorta  (CMS-[facility])    Hyperhidrosis    Mixed hyperlipidemia    Essential hypertension    Kidney stones    Left ventricular hypertrophy    Long term (current) use of opiate analgesic    Stenosis of carotid artery    Vitamin B12 deficiency    Vitamin D deficiency    Medication management    Routine adult health maintenance    Advanced directives, counseling/discussion    Screening for multiple conditions    Cardiac risk counseling    Disorder of bone density and structure, unspecified    Lumbar spondylosis    Symptomatic menopausal or female climacteric states    Screen for colon cancer    Encounter for screening mammogram for breast cancer    Cognitive complaints    Encounter for monitoring denosumab therapy    Alcohol screening    Encounter for screening involving social determinants of health (SDoH)    Encounter for gynecological examination without abnormal finding       Comprehensive Medical/Surgical/Social/Family History  Past Medical History:   Diagnosis Date    Colon cancer screening 04/01/2024    COloguard (-)    H/O bone density study     4/28/22: T score -5.0 (forearm radius), spine normal 6/16: osteopenia; -3.5; -4.0 7/20 -3.4 forearm    H/O cardiovascular stress test     6/22: CONCLUSIONS:  1. SPECT Perfusion Study: Normal.  2. There is no scintigraphic evidence for inducible ischemia.  3. No evidence of scarred myocardium.  4. Left ventricle is normal in size. The left ventricle systolic  function is normal.  5. Right ventricle is normal in size. The right ventricle systolic  function is normal.  6. This is a low risk scan.   LVEF % 69 -71    H/O cervical spine x-ray     2007; Narrowing of the C4-C5, C5-C6, and C6-C7 disc spaces    H/O CT scan of abdomen     2006: Left UPJ or proximal ureteral calculus with associated mild hydronephrosis, punctate calculus right and left kidney; 3.6mm stone distal left ureter at UV junction, stone in right kidney unchanged    H/O CT scan of brain     2006 (-)    H/O Doppler  ultrasound     11/16: Mild bilateral carotid bifurcation occlusive disease; 20-39% stenosis. 3/19: Compared to prior study of 6/14/2016, No significant change.    H/O echocardiogram     5/22: There is mild upper septal left  ventricular hypertrophy. Left ventricular systolic function is borderline normal.  EF = 54  5% (2D 4-ch.) Grade I left ventricular diastolic dysfunction.  - Basal inferior/inferoseptal severe hypokinesis/akinesis.  - The right ventricle is normal in size. Right ventricular systolic function is  normal.  - The left atrial cavity is moderately dilated.    H/O echocardiogram     (cont) - Aorta dilated at 4.0cm at Sinus (remainder not well visualized).  - Mild mitral (trivial-1+), tricuspid (trace), and pulmonic (trace-1+)  regurgitation.  - Mild (1+) aortic regurgitation.  - Estimated low/normal left and right atrial filling pressures.  6/15: Septal hypertrophy, Mild tricuspid regurgitation, normal PASP, DD; mild LVH, regional abnormalities in the RCA territory; LAE    H/O electrocardiogram     2007: NSR, frequent PVCs, rare PACs    H/O magnetic resonance imaging of brain and brain stem     2005 (-)    H/O magnetic resonance imaging of cervical spine     3/22: Multilevel cervical spondylosis with bulging disc, greatest at C4-5. There is mass effect upon the ventral surface of the spinal cord at C4-5 asymmetric to the left. There is bilateral multilevel neural foraminal narrowing of the cervical spine which is more pronounced at C3-4 on the right, C4-5 and C5-6 on the left, and C6-7 bilaterally.    H/O x-ray of lower extremity 03/31/2024    XR Toe: Status post right 4th proximal interphalangeal joint arthroplasty without evidence gross complication    History of Holter monitoring     2007: NSR, frequent PVCs, rare PACs     Past Surgical History:   Procedure Laterality Date    CARDIAC CATHETERIZATION  08/15/2018    9/11: LV function is normal with severe hypokinesis of inferior wall, LAD stents x2.  Chronically occluded RCA that fills via left-to-right collaterals. Occluded major diagonal, which also fills via left-to-left collaterals.    CHOLECYSTECTOMY  08/15/2018    Cholecystectomy    INCISIONAL BREAST BIOPSY  08/15/2018    Incisional Breast Biopsy    OTHER SURGICAL HISTORY  08/15/2018    Continuous ECG Holter Monitor 24 Hour    OTHER SURGICAL HISTORY  08/15/2018    Hammertoe Operation (Each Toe)    TOTAL HIP ARTHROPLASTY  08/15/2018    Hip Replacement     Social History     Social History Narrative    Social History:    , living with son    Retired    Nonsmoker    Social ETOH: 2/week    ---    Family History:    pt adopted    Granddaughter: Neuropsychologist         Tobacco/Alcohol/Opioid use, as well as Illicit Drug Use was screened for/reviewed and documented in Social Documentation section of the chart and medication list as appropriate     Depression Screening  Depression screening completed using the PHQ-2 questions, with results documented in the chart/encounter (5 min spent on this).  (See Rooming Screening section for documentation, and/or progress note for additional information)    Cardiac Risk Assessment (15 minutes spent on this)  Cardiovascular risk was discussed and, if needed, lifestyle modifications recommended, including nutritional choices, exercise, and elimination of habits contributing to risk. We agreed on a plan to reduce the current cardiovascular risk based on above discussion as needed.     Aspirin use/disuse was discussed and documented in the Problem List of the medical record (under Cardiac Risk Counseling) after reviewing the updated guidelines below:  Consider low dose Aspirin ( mg) use if the benefit for cardiovascular disease prevention outweighs risk for bleeding complications.   Discussed that in general, low dose ASA should be considered:  In patients WITHOUT prior MI/stroke/PAD (primary prevention):   a. Age <60: Use if 10-year cardiovascular disease risk  >20%, with discussion of risks and benefits with patient  b. Age 60-<70: Use if 10-year cardiovascular disease risk >20% and low bleeding (e.g., gastrointestinal) risk, with discussion of risks and benefits with patient  c. Age >=70: Do not use    In patients WITH prior MI/stroke/PAD (secondary prevention):   Generally use unless extremely high bleeding (e.g., gastrointenstinal) risk, with discussion of risks and benefits with patient    Advance Directives Discussion  Advanced Care Planning (including a Living Will, Healthcare POA, as well as specific end of life choices and/or directives), was discussed with the patient and/or surrogate, voluntarily, and details of that discussion documented in the Problem List (under Advanced Directives Discussion) of the medical record.   (16 min spent discussing above)     During the course of the visit the patient was educated and counseled about age appropriate screening and preventive services.   Completed preventive screenings were documented in the chart (see Routine Health Maintenance in Problem List) and orders were placed for outstanding screenings/procedures as documented in the Assessment and Plan.    Patient Instructions (the written plan) was given to the patient at check out as part of the AVS.

## 2024-07-24 ENCOUNTER — TELEPHONE (OUTPATIENT)
Dept: PRIMARY CARE | Facility: CLINIC | Age: 79
End: 2024-07-24

## 2024-07-24 ENCOUNTER — CLINICAL SUPPORT (OUTPATIENT)
Dept: PRIMARY CARE | Facility: CLINIC | Age: 79
End: 2024-07-24
Payer: MEDICARE

## 2024-07-24 DIAGNOSIS — H61.23 CERUMEN DEBRIS ON TYMPANIC MEMBRANE OF BOTH EARS: ICD-10-CM

## 2024-07-24 PROCEDURE — 69210 REMOVE IMPACTED EAR WAX UNI: CPT | Performed by: NURSE PRACTITIONER

## 2024-07-24 NOTE — TELEPHONE ENCOUNTER
7/22/24 BMD at [facility]  Lowest T - 3.4 in forearm  In 2022, lowest T was - 5.0; prolia is helping

## 2024-08-16 ENCOUNTER — TELEPHONE (OUTPATIENT)
Dept: PRIMARY CARE | Facility: CLINIC | Age: 79
End: 2024-08-16
Payer: MEDICARE

## 2024-08-16 DIAGNOSIS — M50.90 CERVICAL DISC DISORDER: ICD-10-CM

## 2024-08-16 DIAGNOSIS — M19.90 ARTHRITIS: ICD-10-CM

## 2024-08-16 RX ORDER — HYDROCODONE BITARTRATE AND ACETAMINOPHEN 5; 325 MG/1; MG/1
1 TABLET ORAL EVERY 8 HOURS PRN
Qty: 90 TABLET | Refills: 0 | Status: SHIPPED | OUTPATIENT
Start: 2024-08-16

## 2024-09-26 DIAGNOSIS — M19.90 ARTHRITIS: ICD-10-CM

## 2024-09-26 DIAGNOSIS — M50.90 CERVICAL DISC DISORDER: ICD-10-CM

## 2024-09-26 RX ORDER — HYDROCODONE BITARTRATE AND ACETAMINOPHEN 5; 325 MG/1; MG/1
1 TABLET ORAL EVERY 8 HOURS PRN
Qty: 90 TABLET | Refills: 0 | Status: SHIPPED | OUTPATIENT
Start: 2024-09-26

## 2024-09-28 DIAGNOSIS — E78.2 MIXED HYPERLIPIDEMIA: ICD-10-CM

## 2024-09-30 RX ORDER — ATORVASTATIN CALCIUM 80 MG/1
80 TABLET, FILM COATED ORAL DAILY
Qty: 90 TABLET | Refills: 3 | Status: SHIPPED | OUTPATIENT
Start: 2024-09-30

## 2024-10-04 ENCOUNTER — APPOINTMENT (OUTPATIENT)
Dept: PRIMARY CARE | Facility: CLINIC | Age: 79
End: 2024-10-04
Payer: MEDICARE

## 2024-10-22 ENCOUNTER — OFFICE VISIT (OUTPATIENT)
Dept: PRIMARY CARE | Facility: CLINIC | Age: 79
End: 2024-10-22
Payer: MEDICARE

## 2024-10-22 VITALS
BODY MASS INDEX: 24.02 KG/M2 | SYSTOLIC BLOOD PRESSURE: 138 MMHG | DIASTOLIC BLOOD PRESSURE: 75 MMHG | WEIGHT: 135.6 LBS | TEMPERATURE: 97.9 F | OXYGEN SATURATION: 94 % | HEART RATE: 59 BPM

## 2024-10-22 DIAGNOSIS — F51.04 CHRONIC INSOMNIA: Primary | ICD-10-CM

## 2024-10-22 DIAGNOSIS — M19.90 ARTHRITIS: ICD-10-CM

## 2024-10-22 DIAGNOSIS — Z79.891 LONG TERM (CURRENT) USE OF OPIATE ANALGESIC: ICD-10-CM

## 2024-10-22 DIAGNOSIS — M50.90 CERVICAL DISC DISORDER: ICD-10-CM

## 2024-10-22 DIAGNOSIS — Z79.899 MEDICATION MANAGEMENT: ICD-10-CM

## 2024-10-22 PROCEDURE — 3075F SYST BP GE 130 - 139MM HG: CPT | Performed by: NURSE PRACTITIONER

## 2024-10-22 PROCEDURE — G2211 COMPLEX E/M VISIT ADD ON: HCPCS | Performed by: NURSE PRACTITIONER

## 2024-10-22 PROCEDURE — 1159F MED LIST DOCD IN RCRD: CPT | Performed by: NURSE PRACTITIONER

## 2024-10-22 PROCEDURE — 1036F TOBACCO NON-USER: CPT | Performed by: NURSE PRACTITIONER

## 2024-10-22 PROCEDURE — 99213 OFFICE O/P EST LOW 20 MIN: CPT | Performed by: NURSE PRACTITIONER

## 2024-10-22 PROCEDURE — 3078F DIAST BP <80 MM HG: CPT | Performed by: NURSE PRACTITIONER

## 2024-10-22 PROCEDURE — 1123F ACP DISCUSS/DSCN MKR DOCD: CPT | Performed by: NURSE PRACTITIONER

## 2024-10-22 PROCEDURE — 1160F RVW MEDS BY RX/DR IN RCRD: CPT | Performed by: NURSE PRACTITIONER

## 2024-10-22 RX ORDER — HYDROCODONE BITARTRATE AND ACETAMINOPHEN 5; 325 MG/1; MG/1
1 TABLET ORAL EVERY 8 HOURS PRN
Qty: 90 TABLET | Refills: 0 | Status: SHIPPED | OUTPATIENT
Start: 2024-10-22

## 2024-10-22 RX ORDER — ZOLPIDEM TARTRATE 10 MG/1
10 TABLET ORAL NIGHTLY PRN
Qty: 90 TABLET | Refills: 0 | Status: SHIPPED | OUTPATIENT
Start: 2024-10-22 | End: 2025-01-20

## 2024-10-22 ASSESSMENT — PATIENT HEALTH QUESTIONNAIRE - PHQ9
2. FEELING DOWN, DEPRESSED OR HOPELESS: NOT AT ALL
SUM OF ALL RESPONSES TO PHQ9 QUESTIONS 1 AND 2: 0
1. LITTLE INTEREST OR PLEASURE IN DOING THINGS: NOT AT ALL

## 2024-10-22 NOTE — PROGRESS NOTES
Subjective   Patient ID: Meg Luong is a 79 y.o. female who presents for csv (Csv vicodin).    Use vicodin for hip pain and back pain  Gets injections through ortho  Has chiropractor  Uses heating pain  Dealing with it well  No AE  Tolerates medication well    Ambien to help her sleep  No ae/se  Tolerates well  Does not combine with vicodin        Review of Systems  ROS completely negative except what was mentioned in the HPI.  Problem List, surgical, social, and family histories which were reviewed and updated as necessary within the EMR. I also personally reviewed the notes, labs, and imaging that pertained to what was documented or discussed in the HPI.    Objective   Physical Exam  Vitals and nursing note reviewed.   Constitutional:       General: She is not in acute distress.     Appearance: Normal appearance.   HENT:      Head: Normocephalic and atraumatic.      Right Ear: External ear normal.      Left Ear: External ear normal.      Nose: Nose normal.      Mouth/Throat:      Mouth: Mucous membranes are moist.   Eyes:      Extraocular Movements: Extraocular movements intact.      Conjunctiva/sclera: Conjunctivae normal.      Pupils: Pupils are equal, round, and reactive to light.   Cardiovascular:      Rate and Rhythm: Normal rate and regular rhythm.      Heart sounds: Normal heart sounds.   Pulmonary:      Effort: Pulmonary effort is normal.      Breath sounds: Normal breath sounds.   Musculoskeletal:         General: Normal range of motion.      Cervical back: Normal range of motion and neck supple.   Skin:     General: Skin is warm and dry.   Neurological:      General: No focal deficit present.      Mental Status: She is alert and oriented to person, place, and time. Mental status is at baseline.   Psychiatric:         Mood and Affect: Mood normal.         Behavior: Behavior normal.         Thought Content: Thought content normal.         Judgment: Judgment normal.         /75   Pulse 59   Temp  36.6 °C (97.9 °F)   Wt 61.5 kg (135 lb 9.6 oz)   SpO2 94%   BMI 24.02 kg/m²     Assessment/Plan    Problem List Items Addressed This Visit       Arthritis    Overview     Uses Vicodin prn hip OA  Also has foot/toe pain: s/p right 4th proximal interphalangeal joint arthroplasty  Declines surgical intervention  Tolerates w/o adverse effects  Stable  Continue         Relevant Medications    HYDROcodone-acetaminophen (Norco) 5-325 mg tablet    Cervical disc disorder    Overview     Narrowing of the C4-C5, C5-C6, and C6-C7 disc spaces  On SNRI for neuropathic pain n past  On Vicodin  symptoms well managed, monitor         Relevant Medications    HYDROcodone-acetaminophen (Norco) 5-325 mg tablet    Chronic insomnia - Primary    Overview     On Ambien (and also on Gabapentin)  Tolerates well, no AE         Relevant Medications    zolpidem (Ambien) 10 mg tablet    Long term (current) use of opiate analgesic    Overview     narcan         Medication management    Overview     CSA, UDS, OARRS, CSV [facility] compliant                Controlled Substance Visit:  I have personally reviewed the OARRS report and have considered the risks of abuse, dependence, addiction and diversion and I believe that it is clinically appropriate for the patient to be prescribed this medication.    Is the patient prescribed a combination of a benzodiazepine and opioid?  No    Last Urine Drug Screen / ordered today: No  Recent Results (from the past 8760 hours)   Confirmation Opiate/Opioid/Benzo Prescription Compliance    Collection Time: 01/05/24 10:53 AM   Result Value Ref Range    Clonazepam <25 <25 ng/mL    7-Aminoclonazepam <25 <25 ng/mL    Alprazolam <25 <25 ng/mL    Alpha-Hydroxyalprazolam <25 <25 ng/mL    Midazolam <25 <25 ng/mL    Alpha-Hydroxymidazolam <25 <25 ng/mL    Chlordiazepoxide <25 <25 ng/mL    Diazepam <25 <25 ng/mL    Nordiazepam <25 <25 ng/mL    Temazepam <25 <25 ng/mL    Oxazepam <25 <25 ng/mL    Lorazepam <25 <25 ng/mL     Methadone <25 <25 ng/mL    EDDP <25 <25 ng/mL    6-Acetylmorphine <25 <25 ng/mL    Codeine <50 <50 ng/mL    Hydrocodone 145 (H) <25 ng/mL    Hydromorphone 136 (H) <25 ng/mL    Morphine  <50 <50 ng/mL    Norhydrocodone 507 (H) <25 ng/mL    Noroxycodone <25 <25 ng/mL    Oxycodone <25 <25 ng/mL    Oxymorphone <25 <25 ng/mL    Fentanyl <2.5 <2.5 ng/mL    Norfentanyl <2.5 <2.5 ng/mL    Tramadol <50 <50 ng/mL    O-Desmethyltramadol <50 <50 ng/mL    Zolpidem <25 <25 ng/mL    Zolpidem Metabolite (ZCA) 631 (H) <25 ng/mL   Screen Opiate/Opioid/Benzo Prescription Compliance    Collection Time: 01/05/24 10:53 AM   Result Value Ref Range    Creatinine, Urine Random 60.3 20.0 - 320.0 mg/dL    Amphetamine Screen, Urine Presumptive Negative Presumptive Negative    Barbiturate Screen, Urine Presumptive Negative Presumptive Negative    Cannabinoid Screen, Urine Presumptive Negative Presumptive Negative    Cocaine Metabolite Screen, Urine Presumptive Negative Presumptive Negative    PCP Screen, Urine Presumptive Negative Presumptive Negative     Results are as expected.     Controlled Substance Agreement:  Date of the Last Agreement: 1/5/24  I have reviewed each line item on the Controlled Substance Agreement including, but not limited to, the benefits, risks, and alternatives to treatment with a Controlled Substance medication(s). The patient has verbalized understanding and signed the agreement.    Opioids:  What is the patient's goal of therapy? Pain management  Is this being achieved with current treatment? yes    I have calculated the patient's Morphine Dose Equivalent (MED):   I have considered referral to Pain Management and/or a specialist, and do not feel it is necessary at this time.    I feel that it is clinically indicated to continue this current medication regimen after consideration of alternative therapies, and other non-opioid treatment.    Opioid Risk Screening:  Family History of Substance Abuse  Alcohol: 0  (3/28/2024 12:00 PM)  Illegal Drugs: 0 (3/28/2024 12:00 PM)  Prescription Drugs: 0 (3/28/2024 12:00 PM)    Personal History of Substance Abuse  Alcohol: 0 (3/28/2024 12:00 PM)  Illegal Drugs: 0 (3/28/2024 12:00 PM)  Prescription Drugs: 0 (3/28/2024 12:00 PM)    Patient Age (16-45)  Age (16-45): 0 (3/28/2024 12:00 PM)    History of Preadolescent Sexual Abuse  History of Preadolescent Sexual Abuse: .0 (3/28/2024 12:00 PM)    Psychological Disease  Attention Deficit Disorder, Obsessive Compulsive Disorder, Bipolar, Schizophrenia: 0 (3/28/2024 12:00 PM)  Depression: 0 (3/28/2024 12:00 PM)    Total Score  Total Score: 0 (3/28/2024 12:00 PM)    Total Score Risk Category  TOTAL SCORE CATEGORY: Low Risk (0-3) (3/28/2024 12:00 PM)        Pain Assessment:  Analgesia  What was your pain level on average during the past week?: 9  What was your pain level at its worst during the past week?: 9  What percentage of your pain has been relieved during the past week?: 90 %  Is the amount of pain relief you are now obtaining from your current pain relievers enough to make a real difference in your life?: Y    Activities of Daily Living  Physical Functioning: Same  Family Relationships: Same  Social Relationships: Same  Mood: Same  Sleep Patterns: Same  Overall Functioning: Same      Sleep Aids:   What is the patient's goal of therapy? sleep  Is this being achieved with current treatment? yes    Activities of Daily Living:   Is your overall impression that this patient is benefiting (symptom reduction outweighs side effects) from sleep aid therapy? Yes     1. Physical Functioning: Same  2. Family Relationship: Same  3. Social Relationship: Same  4. Mood: Same  5. Sleep Patterns: Better  6. Overall Function: Better

## 2024-10-25 ENCOUNTER — TELEPHONE (OUTPATIENT)
Dept: PRIMARY CARE | Facility: CLINIC | Age: 79
End: 2024-10-25
Payer: MEDICARE

## 2024-10-25 NOTE — TELEPHONE ENCOUNTER
UC was completed and PAN sensitive. Patient stated she was not having any sx and was advised to call office if she develops any sx.

## 2024-10-25 NOTE — TELEPHONE ENCOUNTER
Add urine culture to labs if able  Not done at    In media section    Or have her do urine culture, order

## 2024-10-28 DIAGNOSIS — E55.9 VITAMIN D DEFICIENCY, UNSPECIFIED: ICD-10-CM

## 2024-10-29 RX ORDER — ASPIRIN 325 MG
50000 TABLET, DELAYED RELEASE (ENTERIC COATED) ORAL
Qty: 12 CAPSULE | Refills: 3 | Status: SHIPPED | OUTPATIENT
Start: 2024-11-03

## 2024-10-31 ENCOUNTER — TELEPHONE (OUTPATIENT)
Dept: PRIMARY CARE | Facility: CLINIC | Age: 79
End: 2024-10-31
Payer: MEDICARE

## 2024-10-31 NOTE — TELEPHONE ENCOUNTER
Pt called in and left Vm stating that she thinks she is getting a bladder infection and would like a Rx sent in for her. I called Pt to triage and schedule with NP. Pt did not answer phone call so VM was left requesting a call back. Per  policy appointment will be needed for Abx to be sent in for her. Will await a call back.

## 2024-11-06 ENCOUNTER — OFFICE VISIT (OUTPATIENT)
Dept: PRIMARY CARE | Facility: CLINIC | Age: 79
End: 2024-11-06
Payer: MEDICARE

## 2024-11-06 VITALS
OXYGEN SATURATION: 97 % | WEIGHT: 135 LBS | DIASTOLIC BLOOD PRESSURE: 75 MMHG | HEART RATE: 55 BPM | TEMPERATURE: 98 F | HEIGHT: 63 IN | BODY MASS INDEX: 23.92 KG/M2 | SYSTOLIC BLOOD PRESSURE: 136 MMHG

## 2024-11-06 DIAGNOSIS — N39.0 URINARY TRACT INFECTION WITHOUT HEMATURIA, SITE UNSPECIFIED: Primary | ICD-10-CM

## 2024-11-06 PROCEDURE — G2211 COMPLEX E/M VISIT ADD ON: HCPCS | Performed by: NURSE PRACTITIONER

## 2024-11-06 PROCEDURE — 1123F ACP DISCUSS/DSCN MKR DOCD: CPT | Performed by: NURSE PRACTITIONER

## 2024-11-06 PROCEDURE — 1160F RVW MEDS BY RX/DR IN RCRD: CPT | Performed by: NURSE PRACTITIONER

## 2024-11-06 PROCEDURE — 3078F DIAST BP <80 MM HG: CPT | Performed by: NURSE PRACTITIONER

## 2024-11-06 PROCEDURE — 99213 OFFICE O/P EST LOW 20 MIN: CPT | Performed by: NURSE PRACTITIONER

## 2024-11-06 PROCEDURE — 3075F SYST BP GE 130 - 139MM HG: CPT | Performed by: NURSE PRACTITIONER

## 2024-11-06 PROCEDURE — 1159F MED LIST DOCD IN RCRD: CPT | Performed by: NURSE PRACTITIONER

## 2024-11-06 PROCEDURE — 1036F TOBACCO NON-USER: CPT | Performed by: NURSE PRACTITIONER

## 2024-11-06 RX ORDER — SULFAMETHOXAZOLE AND TRIMETHOPRIM 800; 160 MG/1; MG/1
1 TABLET ORAL 2 TIMES DAILY
Qty: 14 TABLET | Refills: 0 | Status: SHIPPED | OUTPATIENT
Start: 2024-11-06 | End: 2024-11-13

## 2024-11-06 NOTE — PROGRESS NOTES
"Problem List Items Addressed This Visit    None  Visit Diagnoses       Urinary tract infection without hematuria, site unspecified    -  Primary    reviewed 10/21/24 (scanned) labs  cx pos  bactrim sensitive  get started on bactrim today  prn fu io    Relevant Medications    sulfamethoxazole-trimethoprim (Bactrim DS) 800-160 mg tablet    Other Relevant Orders    Urinalysis with Reflex Microscopic    Urine Culture             Subjective   Patient ID: Meg Luong is a 79 y.o. female who presents for Cystitis (Bladder infection started 4 days ago drank lots of fluids and has calmed down/Sx burning frequency ).  HPI  No fever  No hematuria  No abd pain or low back pain   No vag discharge     Review of Systems   All other systems reviewed and are negative.      BP Readings from Last 3 Encounters:   11/06/24 136/75   10/22/24 138/75   07/23/24 100/64      Wt Readings from Last 3 Encounters:   11/06/24 61.2 kg (135 lb)   10/22/24 61.5 kg (135 lb 9.6 oz)   07/23/24 60.1 kg (132 lb 8 oz)      BMI:   Estimated body mass index is 23.91 kg/m² as calculated from the following:    Height as of this encounter: 1.6 m (5' 3\").    Weight as of this encounter: 61.2 kg (135 lb).    Objective   Physical Exam  Constitutional:       General: She is not in acute distress.  HENT:      Head: Normocephalic and atraumatic.      Nose: Nose normal.      Mouth/Throat:      Mouth: Mucous membranes are moist.   Eyes:      Extraocular Movements: Extraocular movements intact.      Conjunctiva/sclera: Conjunctivae normal.   Cardiovascular:      Rate and Rhythm: Normal rate and regular rhythm.      Pulses: Normal pulses.   Pulmonary:      Effort: Pulmonary effort is normal.      Breath sounds: Normal breath sounds.   Abdominal:      General: Bowel sounds are normal.      Palpations: Abdomen is soft.      Tenderness: There is right CVA tenderness. There is no left CVA tenderness.   Musculoskeletal:         General: Normal range of motion.      " Cervical back: Normal range of motion and neck supple.   Skin:     General: Skin is warm and dry.   Neurological:      General: No focal deficit present.      Mental Status: She is alert.   Psychiatric:         Mood and Affect: Mood normal.

## 2024-11-07 ENCOUNTER — APPOINTMENT (OUTPATIENT)
Dept: PRIMARY CARE | Facility: CLINIC | Age: 79
End: 2024-11-07
Payer: MEDICARE

## 2024-11-15 DIAGNOSIS — M50.90 CERVICAL DISC DISORDER: ICD-10-CM

## 2024-11-15 DIAGNOSIS — M19.90 ARTHRITIS: ICD-10-CM

## 2024-11-15 RX ORDER — HYDROCODONE BITARTRATE AND ACETAMINOPHEN 5; 325 MG/1; MG/1
1 TABLET ORAL EVERY 8 HOURS PRN
Qty: 90 TABLET | Refills: 0 | Status: SHIPPED | OUTPATIENT
Start: 2024-11-15

## 2024-12-02 DIAGNOSIS — M79.674 PAIN OF TOE OF RIGHT FOOT: ICD-10-CM

## 2024-12-02 RX ORDER — GABAPENTIN 100 MG/1
100 CAPSULE ORAL 3 TIMES DAILY
Qty: 270 CAPSULE | Refills: 3 | Status: SHIPPED | OUTPATIENT
Start: 2024-12-02 | End: 2025-12-02

## 2024-12-06 ENCOUNTER — APPOINTMENT (OUTPATIENT)
Dept: PRIMARY CARE | Facility: CLINIC | Age: 79
End: 2024-12-06
Payer: MEDICARE

## 2024-12-19 DIAGNOSIS — M50.90 CERVICAL DISC DISORDER: ICD-10-CM

## 2024-12-19 DIAGNOSIS — M19.90 ARTHRITIS: ICD-10-CM

## 2024-12-19 RX ORDER — HYDROCODONE BITARTRATE AND ACETAMINOPHEN 5; 325 MG/1; MG/1
1 TABLET ORAL EVERY 8 HOURS PRN
Qty: 90 TABLET | Refills: 0 | Status: SHIPPED | OUTPATIENT
Start: 2024-12-19

## 2025-01-03 ENCOUNTER — APPOINTMENT (OUTPATIENT)
Dept: PRIMARY CARE | Facility: CLINIC | Age: 80
End: 2025-01-03
Payer: MEDICARE

## 2025-01-08 DIAGNOSIS — F51.04 CHRONIC INSOMNIA: ICD-10-CM

## 2025-01-08 RX ORDER — ZOLPIDEM TARTRATE 10 MG/1
10 TABLET ORAL NIGHTLY PRN
Qty: 90 TABLET | Refills: 0 | OUTPATIENT
Start: 2025-01-08 | End: 2025-04-08

## 2025-01-08 NOTE — TELEPHONE ENCOUNTER
"Refill Request      Last OV with PCP 11/6/2024     Future Appointments       Date / Time Provider Department Dept Phone    1/10/2025 10:00 AM (Arrive by 9:45 AM) SHERRIE Nunez-CNP [facility] [phone]    7/18/2025 3:00 PM (Arrive by 2:45 PM) Thea Bach MD [facility] [phone]          No results found for: \"HGBA1C\"  No results found for: \"CHOL\"  No results found for: \"HDL\"  No results found for: \"LDLCALC\"  No results found for: \"TRIG\"  No components found for: \"CHOLHDL\"  Lab Results   Component Value Date    TSH 1.23 01/06/2022     No results found for: \"GLUCOSE\", \"CALCIUM\", \"NA\", \"K\", \"CO2\", \"CL\", \"BUN\", \"CREATININE\"      "

## 2025-01-09 RX ORDER — ZOLPIDEM TARTRATE 10 MG/1
10 TABLET ORAL NIGHTLY PRN
Qty: 90 TABLET | Refills: 0 | Status: SHIPPED | OUTPATIENT
Start: 2025-01-09 | End: 2025-01-10 | Stop reason: SDUPTHER

## 2025-01-10 ENCOUNTER — APPOINTMENT (OUTPATIENT)
Dept: PRIMARY CARE | Facility: CLINIC | Age: 80
End: 2025-01-10
Payer: MEDICARE

## 2025-01-10 ENCOUNTER — OFFICE VISIT (OUTPATIENT)
Dept: PRIMARY CARE | Facility: CLINIC | Age: 80
End: 2025-01-10
Payer: MEDICARE

## 2025-01-10 VITALS
DIASTOLIC BLOOD PRESSURE: 72 MMHG | OXYGEN SATURATION: 97 % | HEIGHT: 63 IN | TEMPERATURE: 97.5 F | HEART RATE: 71 BPM | WEIGHT: 133.2 LBS | SYSTOLIC BLOOD PRESSURE: 113 MMHG | BODY MASS INDEX: 23.6 KG/M2

## 2025-01-10 DIAGNOSIS — Z51.81 ENCOUNTER FOR MONITORING DENOSUMAB THERAPY: ICD-10-CM

## 2025-01-10 DIAGNOSIS — M19.90 ARTHRITIS: ICD-10-CM

## 2025-01-10 DIAGNOSIS — M81.0 AGE-RELATED OSTEOPOROSIS WITHOUT CURRENT PATHOLOGICAL FRACTURE: ICD-10-CM

## 2025-01-10 DIAGNOSIS — Z79.899 ENCOUNTER FOR MONITORING DENOSUMAB THERAPY: ICD-10-CM

## 2025-01-10 DIAGNOSIS — M50.90 CERVICAL DISC DISORDER: ICD-10-CM

## 2025-01-10 DIAGNOSIS — F51.04 CHRONIC INSOMNIA: Primary | ICD-10-CM

## 2025-01-10 DIAGNOSIS — Z79.899 MEDICATION MANAGEMENT: ICD-10-CM

## 2025-01-10 DIAGNOSIS — R31.9 HEMATURIA, UNSPECIFIED TYPE: ICD-10-CM

## 2025-01-10 DIAGNOSIS — Z79.891 LONG TERM (CURRENT) USE OF OPIATE ANALGESIC: ICD-10-CM

## 2025-01-10 PROCEDURE — 80307 DRUG TEST PRSMV CHEM ANLYZR: CPT

## 2025-01-10 PROCEDURE — 80368 SEDATIVE HYPNOTICS: CPT

## 2025-01-10 PROCEDURE — 80358 DRUG SCREENING METHADONE: CPT

## 2025-01-10 PROCEDURE — 80346 BENZODIAZEPINES1-12: CPT

## 2025-01-10 PROCEDURE — 80361 OPIATES 1 OR MORE: CPT

## 2025-01-10 PROCEDURE — 82570 ASSAY OF URINE CREATININE: CPT

## 2025-01-10 PROCEDURE — 81001 URINALYSIS AUTO W/SCOPE: CPT

## 2025-01-10 PROCEDURE — 80354 DRUG SCREENING FENTANYL: CPT

## 2025-01-10 PROCEDURE — 80373 DRUG SCREENING TRAMADOL: CPT

## 2025-01-10 PROCEDURE — 80365 DRUG SCREENING OXYCODONE: CPT

## 2025-01-10 RX ORDER — ZOLPIDEM TARTRATE 10 MG/1
10 TABLET ORAL NIGHTLY PRN
Qty: 90 TABLET | Refills: 0 | Status: SHIPPED | OUTPATIENT
Start: 2025-01-10 | End: 2025-04-10

## 2025-01-10 ASSESSMENT — LIFESTYLE VARIABLES: TOTAL SCORE: 0

## 2025-01-10 NOTE — PATIENT INSTRUCTIONS
Tips for Improved Sleep Quality  1. Have a consistent wake time, even on weekends.  Get out of bed no matter if you had good or bad sleep.  It's important to wake daily at the same time.  2. It's ok to go to bed when you feel sleepy, but not before your usual bed time. Spending long periods lying in bed can lead to shallow, fragmented sleep.  3. Your bed should be used for sleep and intimacy only.  Reading, watching tv, eating, and using electronics are NOT recommended.  The goal is to re-associate your bed with sleep.  4. If you can't sleep for greater than 15 minutes, get out of bed. Go to another room or chair in your bedroom.  Engage in an activity that is relaxing, distracting or non-stimulating. When you feel drowsy again, you back to your bed.  5. Don't nap during the day. This can interfere with your sleep.  6. Create a 30-60 minute quiet time before bed.  Engage in an activity that is enjoyable and relaxing, but not goal-oriented.  7. If you are worrying, planning or feeling anxious when you lay in bed, get up and stay out of bed until your mind is relaxed.  You can use a journal to makes lists and jot down your worries.  8. Turn your clock around.  9. Caffeine should be limited and consumed before noon.  10. Alcohol should be limited and not consumed within 2 hours of bedtime.  11. Regular exercise is encouraged, but not right before bedtime.  12. Your bedroom should be dark, quiet and cool.  13. Don't eat a heavy meal before bed.  14. A log of your sleep would include: date, time you went to bed/woke up, time you woke for the day, and naps.

## 2025-01-10 NOTE — PROGRESS NOTES
Problem List Items Addressed This Visit          Medium    Age-related osteoporosis without current pathological fracture    Overview     Prolia started 10/12  BMD 4/28/22: T score -5.0 (forearm radius)  Last injection 1/10/25         Relevant Medications    denosumab (Prolia) injection 60 mg (Start on 1/11/2025 12:00 AM)    Arthritis    Overview     Uses Vicodin prn hip OA  Also has foot/toe pain: s/p right 4th proximal interphalangeal joint arthroplasty  Declines surgical intervention  Tolerates w/o adverse effects  Stable  Continue         Cervical disc disorder    Overview     Narrowing of the C4-C5, C5-C6, and C6-C7 disc spaces  On SNRI for neuropathic pain n past  On Vicodin  symptoms well managed, monitor         Chronic insomnia - Primary    Overview     On Ambien (and also on Gabapentin)  Tolerates well, no AE         Relevant Medications    zolpidem (Ambien) 10 mg tablet    Other Relevant Orders    Opiate/Opioid/Benzo Prescription Compliance    Zolpidem Confirmation, Urine    OOB Internal Tracking    Encounter for monitoring denosumab therapy    Overview     Prolia 1/5/24; 7/5/24, 1/20/25 4/2022 BMD lowest T - 5.0  7/2024 BMD lowest T - 3.4 forearm radius  1/8/24: see encounter from Gloria: Spoke with Belle at the dentist office.  Relayed message that patient had a prolia injection on 1/5/24.  She stated she only has a cleaning scheduled as of right now.  She will let the dentist know and if they have any further questions they will call us back.         Relevant Medications    denosumab (Prolia) injection 60 mg (Start on 1/11/2025 12:00 AM)    Long term (current) use of opiate analgesic    Overview     narcan         Relevant Orders    Opiate/Opioid/Benzo Prescription Compliance    Zolpidem Confirmation, Urine    OOB Internal Tracking    Medication management    Overview     CSA, UDS, OARRS, CSV [facility] compliant            Relevant Orders    Opiate/Opioid/Benzo Prescription Compliance    Zolpidem  Confirmation, Urine    OOB Internal Tracking     Other Visit Diagnoses       Hematuria, unspecified type        urine specimen very dark -> dip trace blood  sent micro/cx  asymptomatic    Relevant Orders    Urinalysis with Reflex Culture and Microscopic           Pt here today for prolia injection  Denies recent dental work  Last Labs: 10/21/24  Calcium = 9.4  Vitamin D = 56.7   Advised to continue Calcium/Vitamin D supplementation  Avoid invasive dental work     Controlled Substance Visit:  I have personally reviewed the OARRS report and have considered the risks of abuse, dependence, addiction and diversion and I believe that it is clinically appropriate for the patient to be prescribed this medication.    Is the patient prescribed a combination of a benzodiazepine and opioid?  No    Last Urine Drug Screen / ordered today: Yes  No results found for this or any previous visit (from the past 8760 hours).  Results are as expected.     Controlled Substance Agreement:  Date of the Last Agreement: 1/10/25  I have reviewed each line item on the Controlled Substance Agreement including, but not limited to, the benefits, risks, and alternatives to treatment with a Controlled Substance medication(s). The patient has verbalized understanding and signed the agreement.    Opioids:  What is the patient's goal of therapy? Pain management   Is this being achieved with current treatment? yes    I have calculated the patient's Morphine Dose Equivalent (MED): 15 mme/day   I have considered referral to Pain Management and/or a specialist, and do not feel it is necessary at this time.    I feel that it is clinically indicated to continue this current medication regimen after consideration of alternative therapies, and other non-opioid treatment.    Opioid Risk Screening:  Family History of Substance Abuse  Alcohol: 0 (3/28/2024 12:00 PM)  Illegal Drugs: 0 (3/28/2024 12:00 PM)  Prescription Drugs: 0 (3/28/2024 12:00 PM)    Personal  History of Substance Abuse  Alcohol: 0 (3/28/2024 12:00 PM)  Illegal Drugs: 0 (3/28/2024 12:00 PM)  Prescription Drugs: 0 (3/28/2024 12:00 PM)    Patient Age (16-45)  Age (16-45): 0 (3/28/2024 12:00 PM)    History of Preadolescent Sexual Abuse  History of Preadolescent Sexual Abuse: .0 (3/28/2024 12:00 PM)    Psychological Disease  Attention Deficit Disorder, Obsessive Compulsive Disorder, Bipolar, Schizophrenia: 0 (3/28/2024 12:00 PM)  Depression: 0 (3/28/2024 12:00 PM)    Total Score  Total Score: 0 (3/28/2024 12:00 PM)    Family History of Substance Abuse  Alcohol: 0 (1/10/2025 10:00 AM)  Illegal Dru (1/10/2025 10:00 AM)  Prescription Dru (1/10/2025 10:00 AM)    Personal History of Substance Abuse  Alcohol: 0 (1/10/2025 10:00 AM)  Illegal Drugs: 0 (1/10/2025 10:00 AM)  Prescription Drugs: 0 (1/10/2025 10:00 AM)    Patient Age (16-45)  Age (16-45): 0 (1/10/2025 10:00 AM)    History of Preadolescent Sexual Abuse  History of Preadolescent Sexual Abuse: 0 (1/10/2025 10:00 AM)    Psychological Disease  Attention Deficit Disorder, Obsessive Compulsive Disorder, Bipolar, Schizophrenia: 0 (1/10/2025 10:00 AM)  Depression: 0 (1/10/2025 10:00 AM)    Total Score  Total Score: 0 (1/10/2025 10:00 AM)    Total Score Risk Category  TOTAL SCORE CATEGORY: Low Risk (0-3) (3/28/2024 12:00 PM)        Pain Assessment:  Analgesia  What was your pain level on average during the past week?: 6  What was your pain level at its worst during the past week?: 9  What percentage of your pain has been relieved during the past week?: 75 %  Is the amount of pain relief you are now obtaining from your current pain relievers enough to make a real difference in your life?: Y    Activities of Daily Living  Physical Functioning: Same  Family Relationships: Same  Social Relationships: Same  Mood: Same  Sleep Patterns: Better  Overall Functioning: Same     and Sleep Aids:   What is the patient's goal of therapy? sleep  Is this being achieved  with current treatment? yes    Activities of Daily Living:   Is your overall impression that this patient is benefiting (symptom reduction outweighs side effects) from sleep aid therapy? Yes     1. Physical Functioning: Same  2. Family Relationship: Same  3. Social Relationship: Same  4. Mood: Same  5. Sleep Patterns: Better  6. Overall Function: Same      Gen: Alert, NAD  HEENT:  PERRLA, EOMI, conjunctiva and sclera normal in appearance; Neck supple  Respiratory:  Lungs CTAB  Cardiovascular:  Heart RRR. No M/R/G  Neuro:  Gross motor and sensory intact  Skin:  No suspicious lesions present   Mood: normal

## 2025-01-11 LAB
AMORPH CRY #/AREA UR COMP ASSIST: NORMAL /HPF
AMPHETAMINES UR QL SCN: NORMAL
APPEARANCE UR: CLEAR
BARBITURATES UR QL SCN: NORMAL
BILIRUB UR STRIP.AUTO-MCNC: NEGATIVE MG/DL
BZE UR QL SCN: NORMAL
CANNABINOIDS UR QL SCN: NORMAL
COLOR UR: YELLOW
CREAT UR-MCNC: 103.4 MG/DL (ref 20–320)
GLUCOSE UR STRIP.AUTO-MCNC: NORMAL MG/DL
HOLD SPECIMEN: NORMAL
KETONES UR STRIP.AUTO-MCNC: NEGATIVE MG/DL
LEUKOCYTE ESTERASE UR QL STRIP.AUTO: NEGATIVE
MUCOUS THREADS #/AREA URNS AUTO: NORMAL /LPF
NITRITE UR QL STRIP.AUTO: NEGATIVE
PCP UR QL SCN: NORMAL
PH UR STRIP.AUTO: 5 [PH]
PROT UR STRIP.AUTO-MCNC: NEGATIVE MG/DL
RBC # UR STRIP.AUTO: ABNORMAL /UL
RBC #/AREA URNS AUTO: NORMAL /HPF
SP GR UR STRIP.AUTO: 1.02
SQUAMOUS #/AREA URNS AUTO: NORMAL /HPF
UROBILINOGEN UR STRIP.AUTO-MCNC: NORMAL MG/DL
WBC #/AREA URNS AUTO: NORMAL /HPF

## 2025-01-15 LAB
1OH-MIDAZOLAM UR CFM-MCNC: <25 NG/ML
6MAM UR CFM-MCNC: <25 NG/ML
7AMINOCLONAZEPAM UR CFM-MCNC: <25 NG/ML
A-OH ALPRAZ UR CFM-MCNC: <25 NG/ML
ALPRAZ UR CFM-MCNC: <25 NG/ML
CHLORDIAZEP UR CFM-MCNC: <25 NG/ML
CLONAZEPAM UR CFM-MCNC: <25 NG/ML
CODEINE UR CFM-MCNC: <50 NG/ML
DIAZEPAM UR CFM-MCNC: <25 NG/ML
EDDP UR CFM-MCNC: <25 NG/ML
FENTANYL UR CFM-MCNC: <2.5 NG/ML
HYDROCODONE CTO UR CFM-MCNC: 365 NG/ML
HYDROMORPHONE UR CFM-MCNC: 111 NG/ML
LORAZEPAM UR CFM-MCNC: <25 NG/ML
METHADONE UR CFM-MCNC: <25 NG/ML
MIDAZOLAM UR CFM-MCNC: <25 NG/ML
MORPHINE UR CFM-MCNC: <50 NG/ML
NORDIAZEPAM UR CFM-MCNC: <25 NG/ML
NORFENTANYL UR CFM-MCNC: <2.5 NG/ML
NORHYDROCODONE UR CFM-MCNC: 845 NG/ML
NOROXYCODONE UR CFM-MCNC: <25 NG/ML
NORTRAMADOL UR-MCNC: <50 NG/ML
OXAZEPAM UR CFM-MCNC: <25 NG/ML
OXYCODONE UR CFM-MCNC: <25 NG/ML
OXYMORPHONE UR CFM-MCNC: <25 NG/ML
TEMAZEPAM UR CFM-MCNC: <25 NG/ML
TRAMADOL UR CFM-MCNC: <50 NG/ML
ZOLPIDEM UR CFM-MCNC: >1000 NG/ML
ZOLPIDEM UR-MCNC: <25 NG/ML

## 2025-01-24 ENCOUNTER — TELEPHONE (OUTPATIENT)
Dept: PRIMARY CARE | Facility: CLINIC | Age: 80
End: 2025-01-24
Payer: MEDICARE

## 2025-01-24 DIAGNOSIS — M19.90 ARTHRITIS: ICD-10-CM

## 2025-01-24 DIAGNOSIS — M50.90 CERVICAL DISC DISORDER: ICD-10-CM

## 2025-01-24 RX ORDER — HYDROCODONE BITARTRATE AND ACETAMINOPHEN 5; 325 MG/1; MG/1
1 TABLET ORAL EVERY 8 HOURS PRN
Qty: 90 TABLET | Refills: 0 | Status: SHIPPED | OUTPATIENT
Start: 2025-01-24

## 2025-01-24 NOTE — TELEPHONE ENCOUNTER
"Refill Request for norco send to [facility] in [place]     Last OV with PCP 1/10/2025     Future Appointments       Date / Time Provider Department Dept Phone    2/7/2025 1:00 PM (Arrive by 12:45 PM) [facility] [id number] MAMMO [facility] [phone]    2/7/2025 1:30 PM (Arrive by 1:15 PM) [facility] [id number] DXA [facility] [phone]    4/11/2025 10:00 AM (Arrive by 9:45 AM) Cherise Patricio, APRN-CNP [facility] [phone]    7/18/2025 3:00 PM (Arrive by 2:45 PM) Thea Bach MD [facility] [phone]          No results found for: \"HGBA1C\"  No results found for: \"CHOL\"  No results found for: \"HDL\"  No results found for: \"LDLCALC\"  No results found for: \"TRIG\"  No components found for: \"CHOLHDL\"  Lab Results   Component Value Date    TSH 1.23 01/06/2022     No results found for: \"GLUCOSE\", \"CALCIUM\", \"NA\", \"K\", \"CO2\", \"CL\", \"BUN\", \"CREATININE\"  "

## 2025-01-31 DIAGNOSIS — I10 ESSENTIAL (PRIMARY) HYPERTENSION: ICD-10-CM

## 2025-01-31 RX ORDER — AMLODIPINE AND BENAZEPRIL HYDROCHLORIDE 5; 40 MG/1; MG/1
1 CAPSULE ORAL DAILY
Qty: 100 CAPSULE | Refills: 3 | Status: SHIPPED | OUTPATIENT
Start: 2025-01-31

## 2025-01-31 NOTE — TELEPHONE ENCOUNTER
"Refill Request      Last OV with PCP 1/10/2025     Future Appointments       Date / Time Provider Department Dept Phone    2/7/2025  1:00 PM (Arrive by 12:45 PM) [facility] [id number] MAMMO [facility] [phone]    2/7/2025 1:30 PM (Arrive by 1:15 PM) [facility] [id number] DXA [facility] [phone]    4/11/2025 10:00 AM (Arrive by 9:45 AM) Cherise Patricio, APRN-CNP [facility] [phone]    7/18/2025 3:00 PM (Arrive by 2:45 PM) Thea Bach MD [facility] [phone]          No results found for: \"HGBA1C\"  No results found for: \"CHOL\"  No results found for: \"HDL\"  No results found for: \"LDLCALC\"  No results found for: \"TRIG\"  No components found for: \"CHOLHDL\"  Lab Results   Component Value Date    TSH 1.23 01/06/2022     No results found for: \"GLUCOSE\", \"CALCIUM\", \"NA\", \"K\", \"CO2\", \"CL\", \"BUN\", \"CREATININE\"    "

## 2025-02-17 DIAGNOSIS — M19.90 ARTHRITIS: ICD-10-CM

## 2025-02-17 DIAGNOSIS — M50.90 CERVICAL DISC DISORDER: ICD-10-CM

## 2025-02-17 RX ORDER — HYDROCODONE BITARTRATE AND ACETAMINOPHEN 5; 325 MG/1; MG/1
1 TABLET ORAL EVERY 8 HOURS PRN
Qty: 90 TABLET | Refills: 0 | Status: SHIPPED | OUTPATIENT
Start: 2025-02-17

## 2025-03-14 DIAGNOSIS — M50.90 CERVICAL DISC DISORDER: ICD-10-CM

## 2025-03-14 DIAGNOSIS — M19.90 ARTHRITIS: ICD-10-CM

## 2025-03-14 RX ORDER — HYDROCODONE BITARTRATE AND ACETAMINOPHEN 5; 325 MG/1; MG/1
1 TABLET ORAL EVERY 8 HOURS PRN
Qty: 90 TABLET | Refills: 0 | Status: SHIPPED | OUTPATIENT
Start: 2025-03-14

## 2025-04-08 DIAGNOSIS — E78.2 MIXED HYPERLIPIDEMIA: ICD-10-CM

## 2025-04-08 RX ORDER — ATORVASTATIN CALCIUM 80 MG/1
80 TABLET, FILM COATED ORAL DAILY
Qty: 90 TABLET | Refills: 3 | Status: SHIPPED | OUTPATIENT
Start: 2025-04-08

## 2025-04-08 NOTE — TELEPHONE ENCOUNTER
"Refill Request      Last OV with PCP 1/10/2025     Future Appointments       Date / Time Provider Department Dept Phone    4/11/2025 10:00 AM (Arrive by 9:45 AM) SHERRIE Nunez-CNP [facility] [phone]    7/18/2025 3:00 PM (Arrive by 2:45 PM) Thea Bach MD [facility] [phone]          No results found for: \"HGBA1C\"  No results found for: \"CHOL\"  No results found for: \"HDL\"  No results found for: \"LDLCALC\"  No results found for: \"TRIG\"  No components found for: \"CHOLHDL\"  Lab Results   Component Value Date    TSH 1.23 01/06/2022     No results found for: \"GLUCOSE\", \"CALCIUM\", \"NA\", \"K\", \"CO2\", \"CL\", \"BUN\", \"CREATININE\"    "

## 2025-04-11 ENCOUNTER — APPOINTMENT (OUTPATIENT)
Dept: PRIMARY CARE | Facility: CLINIC | Age: 80
End: 2025-04-11
Payer: MEDICARE

## 2025-04-18 ENCOUNTER — APPOINTMENT (OUTPATIENT)
Dept: PRIMARY CARE | Facility: CLINIC | Age: 80
End: 2025-04-18
Payer: MEDICARE

## 2025-04-24 ENCOUNTER — OFFICE VISIT (OUTPATIENT)
Dept: PRIMARY CARE | Facility: CLINIC | Age: 80
End: 2025-04-24
Payer: MEDICARE

## 2025-04-24 VITALS
TEMPERATURE: 97.6 F | OXYGEN SATURATION: 98 % | BODY MASS INDEX: 23.57 KG/M2 | SYSTOLIC BLOOD PRESSURE: 114 MMHG | DIASTOLIC BLOOD PRESSURE: 72 MMHG | WEIGHT: 133 LBS | HEIGHT: 63 IN | HEART RATE: 66 BPM

## 2025-04-24 DIAGNOSIS — Z79.899 MEDICATION MANAGEMENT: ICD-10-CM

## 2025-04-24 DIAGNOSIS — F51.04 CHRONIC INSOMNIA: ICD-10-CM

## 2025-04-24 DIAGNOSIS — M50.90 CERVICAL DISC DISORDER: ICD-10-CM

## 2025-04-24 DIAGNOSIS — Z79.891 LONG TERM (CURRENT) USE OF OPIATE ANALGESIC: ICD-10-CM

## 2025-04-24 DIAGNOSIS — E55.9 VITAMIN D DEFICIENCY: ICD-10-CM

## 2025-04-24 DIAGNOSIS — M19.90 ARTHRITIS: Primary | ICD-10-CM

## 2025-04-24 PROCEDURE — G2211 COMPLEX E/M VISIT ADD ON: HCPCS | Performed by: NURSE PRACTITIONER

## 2025-04-24 PROCEDURE — 1160F RVW MEDS BY RX/DR IN RCRD: CPT | Performed by: NURSE PRACTITIONER

## 2025-04-24 PROCEDURE — 99213 OFFICE O/P EST LOW 20 MIN: CPT | Performed by: NURSE PRACTITIONER

## 2025-04-24 PROCEDURE — 1036F TOBACCO NON-USER: CPT | Performed by: NURSE PRACTITIONER

## 2025-04-24 PROCEDURE — 3074F SYST BP LT 130 MM HG: CPT | Performed by: NURSE PRACTITIONER

## 2025-04-24 PROCEDURE — 3078F DIAST BP <80 MM HG: CPT | Performed by: NURSE PRACTITIONER

## 2025-04-24 PROCEDURE — 1159F MED LIST DOCD IN RCRD: CPT | Performed by: NURSE PRACTITIONER

## 2025-04-24 PROCEDURE — 1123F ACP DISCUSS/DSCN MKR DOCD: CPT | Performed by: NURSE PRACTITIONER

## 2025-04-24 RX ORDER — ZOLPIDEM TARTRATE 10 MG/1
10 TABLET ORAL NIGHTLY PRN
Qty: 90 TABLET | Refills: 0 | Status: SHIPPED | OUTPATIENT
Start: 2025-04-24 | End: 2025-07-23

## 2025-04-24 RX ORDER — HYDROCODONE BITARTRATE AND ACETAMINOPHEN 5; 325 MG/1; MG/1
1 TABLET ORAL EVERY 8 HOURS PRN
Qty: 90 TABLET | Refills: 0 | Status: SHIPPED | OUTPATIENT
Start: 2025-04-24

## 2025-04-24 RX ORDER — ASPIRIN 325 MG
1.25 TABLET, DELAYED RELEASE (ENTERIC COATED) ORAL
Qty: 12 CAPSULE | Refills: 3 | Status: SHIPPED | OUTPATIENT
Start: 2025-04-27

## 2025-04-24 ASSESSMENT — PATIENT HEALTH QUESTIONNAIRE - PHQ9
SUM OF ALL RESPONSES TO PHQ9 QUESTIONS 1 AND 2: 0
1. LITTLE INTEREST OR PLEASURE IN DOING THINGS: NOT AT ALL
2. FEELING DOWN, DEPRESSED OR HOPELESS: NOT AT ALL

## 2025-04-24 NOTE — PROGRESS NOTES
Problem List Items Addressed This Visit          Medium    Arthritis - Primary    Overview   Uses Vicodin prn hip OA  Also has foot/toe pain: s/p right 4th proximal interphalangeal joint arthroplasty  Declines surgical intervention  Tolerates w/o adverse effects  Stable  Continue         Relevant Medications    HYDROcodone-acetaminophen (Norco) 5-325 mg tablet    Cervical disc disorder    Overview   Narrowing of the C4-C5, C5-C6, and C6-C7 disc spaces  On SNRI for neuropathic pain n past  On Vicodin  symptoms well managed, monitor         Relevant Medications    HYDROcodone-acetaminophen (Norco) 5-325 mg tablet    Chronic insomnia    Overview   On Ambien (and also on Gabapentin)  Tolerates well, no AE         Relevant Medications    zolpidem (Ambien) 10 mg tablet    Long term (current) use of opiate analgesic    Overview   narcan         Medication management    Overview   CSA, UDS, OARRS, CSV [facility] compliant            Vitamin D deficiency    Overview   12/2020 = 32.4  Goal 30-40 (hx kidney stones)  On 47481 weekly          Relevant Medications    cholecalciferol (Vitamin D-3) 1.25 mg (50,000 units) capsule (Start on 4/27/2025)        Controlled Substance Visit:  I have personally reviewed the OARRS report and have considered the risks of abuse, dependence, addiction and diversion and I believe that it is clinically appropriate for the patient to be prescribed this medication.    Is the patient prescribed a combination of a benzodiazepine and opioid?  No    Last Urine Drug Screen / ordered today: No  Recent Results (from the past 8760 hours)   Confirmation Opiate/Opioid/Benzo Prescription Compliance    Collection Time: 01/10/25 10:28 AM   Result Value Ref Range    Clonazepam <25 <25 ng/mL    7-Aminoclonazepam <25 <25 ng/mL    Alprazolam <25 <25 ng/mL    Alpha-Hydroxyalprazolam <25 <25 ng/mL    Midazolam <25 <25 ng/mL    Alpha-Hydroxymidazolam <25 <25 ng/mL    Chlordiazepoxide <25 <25 ng/mL    Diazepam <25 <25 ng/mL     Nordiazepam <25 <25 ng/mL    Temazepam <25 <25 ng/mL    Oxazepam <25 <25 ng/mL    Lorazepam <25 <25 ng/mL    Methadone <25 <25 ng/mL    EDDP <25 <25 ng/mL    6-Acetylmorphine <25 <25 ng/mL    Codeine <50 <50 ng/mL    Hydrocodone 365 (H) <25 ng/mL    Hydromorphone 111 (H) <25 ng/mL    Morphine  <50 <50 ng/mL    Norhydrocodone 845 (H) <25 ng/mL    Noroxycodone <25 <25 ng/mL    Oxycodone <25 <25 ng/mL    Oxymorphone <25 <25 ng/mL    Fentanyl <2.5 <2.5 ng/mL    Norfentanyl <2.5 <2.5 ng/mL    Tramadol <50 <50 ng/mL    O-Desmethyltramadol <50 <50 ng/mL    Zolpidem <25 <25 ng/mL    Zolpidem Metabolite (ZCA) >1,000 (H) <25 ng/mL   Screen Opiate/Opioid/Benzo Prescription Compliance    Collection Time: 01/10/25 10:28 AM   Result Value Ref Range    Creatinine, Urine Random 103.4 20.0 - 320.0 mg/dL    Amphetamine Screen, Urine Presumptive Negative Presumptive Negative    Barbiturate Screen, Urine Presumptive Negative Presumptive Negative    Cannabinoid Screen, Urine Presumptive Negative Presumptive Negative    Cocaine Metabolite Screen, Urine Presumptive Negative Presumptive Negative    PCP Screen, Urine Presumptive Negative Presumptive Negative     Results are as expected.     Controlled Substance Agreement:  Date of the Last Agreement: 01/10/2025  I have reviewed each line item on the Controlled Substance Agreement including, but not limited to, the benefits, risks, and alternatives to treatment with a Controlled Substance medication(s). The patient has verbalized understanding and signed the agreement.    Opioids:  What is the patient's goal of therapy? Hip Pain  Is this being achieved with current treatment? Yes    I have calculated the patient's Morphine Dose Equivalent (MED): 15 mme/day  I have considered referral to Pain Management and/or a specialist, and do not feel it is necessary at this time.    I feel that it is clinically indicated to continue this current medication regimen after consideration of alternative  therapies, and other non-opioid treatment.    Opioid Risk Screening:  Family History of Substance Abuse  Alcohol: 0 (1/10/2025 10:00 AM)  Illegal Dru (1/10/2025 10:00 AM)  Prescription Dru (1/10/2025 10:00 AM)    Personal History of Substance Abuse  Alcohol: 0 (1/10/2025 10:00 AM)  Illegal Drugs: 0 (1/10/2025 10:00 AM)  Prescription Drugs: 0 (1/10/2025 10:00 AM)    Patient Age (16-45)  Age (16-45): 0 (1/10/2025 10:00 AM)    History of Preadolescent Sexual Abuse  History of Preadolescent Sexual Abuse: 0 (1/10/2025 10:00 AM)    Psychological Disease  Attention Deficit Disorder, Obsessive Compulsive Disorder, Bipolar, Schizophrenia: 0 (1/10/2025 10:00 AM)  Depression: 0 (1/10/2025 10:00 AM)    Total Score  Total Score: 0 (1/10/2025 10:00 AM)        Pain Assessment:  Analgesia  What was your pain level on average during the past week?: 7  What was your pain level at its worst during the past week?: 9  What percentage of your pain has been relieved during the past week?: 70 %  Is the amount of pain relief you are now obtaining from your current pain relievers enough to make a real difference in your life?: Y    Activities of Daily Living  Physical Functioning: Better  Family Relationships: Better  Social Relationships: Better  Mood: Better  Sleep Patterns: Better  Overall Functioning: Better    Adverse Events  Is patient experiencing any side effects from current pain relievers?: N  Patient's Overall Severity of Side Effects: None      Assessment  Is your overall impression that this patient is benefiting from opioid therapy?: Yes      Gen: Alert, NAD  HEENT:  PERRLA, EOMI, conjunctiva and sclera normal in appearance; Neck supple  Respiratory:  Lungs CTAB  Cardiovascular:  Heart RRR. No M/R/G  Neuro:  Gross motor and sensory intact  Skin:  No suspicious lesions present   Mood: normal

## 2025-06-16 ENCOUNTER — TRANSCRIBE ORDERS (OUTPATIENT)
Dept: INFUSION THERAPY | Facility: CLINIC | Age: 80
End: 2025-06-16
Payer: MEDICARE

## 2025-06-16 DIAGNOSIS — M81.0 AGE-RELATED OSTEOPOROSIS WITHOUT CURRENT PATHOLOGICAL FRACTURE: Primary | ICD-10-CM

## 2025-06-16 RX ORDER — DIPHENHYDRAMINE HYDROCHLORIDE 50 MG/ML
50 INJECTION, SOLUTION INTRAMUSCULAR; INTRAVENOUS AS NEEDED
OUTPATIENT
Start: 2025-07-10

## 2025-06-16 RX ORDER — FAMOTIDINE 10 MG/ML
20 INJECTION, SOLUTION INTRAVENOUS ONCE AS NEEDED
OUTPATIENT
Start: 2025-07-10

## 2025-06-16 RX ORDER — ALBUTEROL SULFATE 0.83 MG/ML
3 SOLUTION RESPIRATORY (INHALATION) AS NEEDED
OUTPATIENT
Start: 2025-07-10

## 2025-06-16 RX ORDER — EPINEPHRINE 0.3 MG/.3ML
0.3 INJECTION SUBCUTANEOUS EVERY 5 MIN PRN
OUTPATIENT
Start: 2025-07-10

## 2025-06-18 DIAGNOSIS — M50.90 CERVICAL DISC DISORDER: ICD-10-CM

## 2025-06-18 DIAGNOSIS — M19.90 ARTHRITIS: ICD-10-CM

## 2025-06-18 RX ORDER — HYDROCODONE BITARTRATE AND ACETAMINOPHEN 5; 325 MG/1; MG/1
1 TABLET ORAL EVERY 8 HOURS PRN
Qty: 90 TABLET | Refills: 0 | Status: SHIPPED | OUTPATIENT
Start: 2025-06-18

## 2025-07-02 ENCOUNTER — TELEPHONE (OUTPATIENT)
Dept: PHARMACY | Facility: HOSPITAL | Age: 80
End: 2025-07-02
Payer: MEDICARE

## 2025-07-02 NOTE — TELEPHONE ENCOUNTER
Population Health: Outreach by Ambulatory Pharmacy Team    Patient: Meg Luong  Primary Care Provider (PCP): Thea Bach MD  Payor: Javan GALVEZ  Reason: Adherence  Medication(s): Atorvastatin 80mg  Outcome: Left Voicemail    CRISTY MCGOWAN

## 2025-07-10 DIAGNOSIS — M81.0 AGE-RELATED OSTEOPOROSIS WITHOUT CURRENT PATHOLOGICAL FRACTURE: ICD-10-CM

## 2025-07-11 ENCOUNTER — DOCUMENTATION (OUTPATIENT)
Dept: INFUSION THERAPY | Facility: CLINIC | Age: 80
End: 2025-07-11
Payer: MEDICARE

## 2025-07-11 NOTE — PROGRESS NOTES
"CLINICAL CLEARANCE FOR OUTPATIENT INJECTION      Patient to be scheduled for Continuation of DENOSUMAB injections.    For Diagnosis: Osteoporosis    Labs required prior to treatment (place lab orders if not already ordered or completed):    Calcium drawn on: No results found for: \"CALCIUM\", \"PHOS\" No results found for: \"ALBUMIN\"  No results found for: \"CAION\"   Calcium >8.6? Needs labs drawn - last labs found in our system are from 10/2024 in media tab  (Serum or Corrected Calcium must be >8.6mg/dl. OR Ionized Calcium must be >1.1 mmol/L or >4.7 mg/dL (depending on resulting agency).  If below WNL - Contact prescribing provider. Labs should be drawn within 28 days of first treatment.)    No results found for: \"EGFR\" Needs labs drawn - last labs found in our system are from 10/2024 in media tab  (Patients with a eGFR <30 are at increased risk of hypocalcemia)      *If eGFR less than 30 reach out to prescribing provider to discuss need for frequent lab monitoring and supplementation. Recommendation is to Monitor serum calcium weekly for the first month after initiation and then at least monthly thereafter due to increased risk for hypocalcemia.    Calcium and Vitamin D supplement on medication list? Vit D  (if no nurse to encourage discussion of supplementation at visit)  Current Medications[1]     No obvious recent dental work per chart review. Nurse to confirm no dental within past/next 4 weeks at encounter.    Urine Hcg test ordered prior to first injection?Not applicable (If female pt <60 years of age and with reproductive capability)    Last injection received: 1/10/25 at doctor's office (if continuation)    Due: now - 7/10/25     Ok to schedule for prolia once we have insurance auth (still pending). Please instruct pt to have labs drawn no more than 28 days prior to their scheduled appointment    [facility] ([place])   [facility]  [address]  [place] [address]  Phone: [phone]         [1]   Current Outpatient Medications:     amLODIPine-benazepriL (Lotrel) 5-40 mg capsule, TAKE 1 CAPSULE BY MOUTH EVERY DAY, Disp: 100 capsule, Rfl: 3    amoxicillin (Amoxil) 500 mg capsule, 4 CAPS (1) HOUR PRIOR TO APPT, Disp: , Rfl:     ascorbic acid (Vitamin C) 500 mg chewable tablet, Chew 1 tablet (500 mg) 2 times a day., Disp: , Rfl:     aspirin 81 mg EC tablet, Take 1 tablet (81 mg) by mouth once daily., Disp: , Rfl:     atorvastatin (Lipitor) 80 mg tablet, Take 1 tablet (80 mg) by mouth once daily., Disp: 90 tablet, Rfl: 3    cholecalciferol (Vitamin D-3) 1.25 mg (50,000 units) capsule, Take 1 capsule (1.25 mg) by mouth 1 (one) time per week., Disp: 12 capsule, Rfl: 3    cyanocobalamin (Vitamin B-12) 1,000 mcg tablet, Take 1 tablet (1,000 mcg) by mouth once daily. AS DIRECTED, Disp: , Rfl:     denosumab (Prolia) 60 mg/mL syringe, Inject 1 mL (60 mg) under the skin every 6 months., Disp: , Rfl:     fluticasone (Flonase) 50 mcg/actuation nasal spray, Administer 1 spray into each nostril in the morning and 1 spray before bedtime. Shake gently. Before first use, prime pump. After use, clean tip and replace cap.., Disp: 16 g, Rfl: 5    gabapentin (Neurontin) 100 mg capsule, Take 1 capsule (100 mg) by mouth 3 times a day., Disp: 270 capsule, Rfl: 3    HYDROcodone-acetaminophen (Norco) 5-325 mg tablet, Take 1 tablet by mouth every 8 hours if needed for severe pain (7 - 10)., Disp: 90 tablet, Rfl: 0    multivitamin-min-iron-FA-vit K (Adults Multivitamin) 18 mg iron-400 mcg-25 mcg tablet, Take 1 tablet by mouth once daily., Disp: , Rfl:     naloxone (Narcan) 4 mg/0.1 mL nasal spray, Administer 1 spray (4 mg) into affected nostril(s) if needed. 4 mg (contents of 1 nasal spray) as a single dose in one nostril; may repeat every 2 to 3 minutes in alternating nostrils, Disp: , Rfl:     nitroglycerin (Nitrostat) 0.4 mg SL tablet, Place 1 tablet (0.4 mg) under the  tongue every 5 minutes if needed., Disp: , Rfl:     omega-3 (Fish Oil) 60- mg capsule, Take 1 capsule (500 mg) by mouth once daily., Disp: , Rfl:     zolpidem (Ambien) 10 mg tablet, Take 1 tablet (10 mg) by mouth as needed at bedtime for sleep., Disp: 90 tablet, Rfl: 0    Current Facility-Administered Medications:     denosumab (Prolia) injection 60 mg, 60 mg, subcutaneous, q6 months, Cherise Patricio, SHERRIE-CNP, 60 mg at 01/10/25 1769

## 2025-07-15 ENCOUNTER — TELEPHONE (OUTPATIENT)
Dept: PRIMARY CARE | Facility: CLINIC | Age: 80
End: 2025-07-15
Payer: MEDICARE

## 2025-07-15 NOTE — TELEPHONE ENCOUNTER
Spoke with patient   Advised we are moving our prolia injections to the infusion center   Spoke with granddaughter and gave address for [facility] verbally understands   She will take her there

## 2025-07-15 NOTE — TELEPHONE ENCOUNTER
Patient scheduled for 7/18/25 with Dr. Bach for MW and Prolia injection.  Patient also scheduled for 7/31/25 in [facility] for Prolia injection.     Patient is wanting to know why she needs to get the prolia in [facility] on the 31st if she is getting it here on the 18th.    Please Advise.

## 2025-07-16 ENCOUNTER — LAB (OUTPATIENT)
Dept: LAB | Facility: HOSPITAL | Age: 80
End: 2025-07-16
Payer: MEDICARE

## 2025-07-16 DIAGNOSIS — M81.0 AGE-RELATED OSTEOPOROSIS WITHOUT CURRENT PATHOLOGICAL FRACTURE: Primary | ICD-10-CM

## 2025-07-16 LAB
ALBUMIN SERPL BCP-MCNC: 4.1 G/DL (ref 3.4–5)
ALP SERPL-CCNC: 46 U/L (ref 33–136)
ALT SERPL W P-5'-P-CCNC: 7 U/L (ref 7–45)
ANION GAP SERPL CALC-SCNC: 12 MMOL/L (ref 10–20)
AST SERPL W P-5'-P-CCNC: 12 U/L (ref 9–39)
BILIRUB SERPL-MCNC: 0.6 MG/DL (ref 0–1.2)
BUN SERPL-MCNC: 17 MG/DL (ref 6–23)
CALCIUM SERPL-MCNC: 9.2 MG/DL (ref 8.6–10.6)
CHLORIDE SERPL-SCNC: 105 MMOL/L (ref 98–107)
CO2 SERPL-SCNC: 26 MMOL/L (ref 21–32)
CREAT SERPL-MCNC: 0.55 MG/DL (ref 0.5–1.05)
EGFRCR SERPLBLD CKD-EPI 2021: >90 ML/MIN/1.73M*2
GLUCOSE SERPL-MCNC: 99 MG/DL (ref 74–99)
POTASSIUM SERPL-SCNC: 4.1 MMOL/L (ref 3.5–5.3)
PROT SERPL-MCNC: 6.7 G/DL (ref 6.4–8.2)
SODIUM SERPL-SCNC: 139 MMOL/L (ref 136–145)

## 2025-07-16 PROCEDURE — 80061 LIPID PANEL: CPT

## 2025-07-16 PROCEDURE — 82607 VITAMIN B-12: CPT

## 2025-07-16 PROCEDURE — 84443 ASSAY THYROID STIM HORMONE: CPT

## 2025-07-16 PROCEDURE — 36415 COLL VENOUS BLD VENIPUNCTURE: CPT

## 2025-07-16 PROCEDURE — 82306 VITAMIN D 25 HYDROXY: CPT

## 2025-07-16 PROCEDURE — 80053 COMPREHEN METABOLIC PANEL: CPT

## 2025-07-18 ENCOUNTER — APPOINTMENT (OUTPATIENT)
Dept: PRIMARY CARE | Facility: CLINIC | Age: 80
End: 2025-07-18
Payer: MEDICARE

## 2025-07-18 VITALS
SYSTOLIC BLOOD PRESSURE: 129 MMHG | DIASTOLIC BLOOD PRESSURE: 69 MMHG | HEART RATE: 58 BPM | BODY MASS INDEX: 22.68 KG/M2 | WEIGHT: 128 LBS | OXYGEN SATURATION: 100 % | HEIGHT: 63 IN | TEMPERATURE: 98.4 F

## 2025-07-18 DIAGNOSIS — M50.90 CERVICAL DISC DISORDER: ICD-10-CM

## 2025-07-18 DIAGNOSIS — E78.2 MIXED HYPERLIPIDEMIA: ICD-10-CM

## 2025-07-18 DIAGNOSIS — I10 ESSENTIAL HYPERTENSION: ICD-10-CM

## 2025-07-18 DIAGNOSIS — I25.10 CORONARY ARTERY DISEASE INVOLVING NATIVE CORONARY ARTERY OF NATIVE HEART WITHOUT ANGINA PECTORIS: ICD-10-CM

## 2025-07-18 DIAGNOSIS — F51.04 CHRONIC INSOMNIA: ICD-10-CM

## 2025-07-18 DIAGNOSIS — Z71.89 ADVANCED DIRECTIVES, COUNSELING/DISCUSSION: ICD-10-CM

## 2025-07-18 DIAGNOSIS — Z23 NEED FOR TDAP VACCINATION: ICD-10-CM

## 2025-07-18 DIAGNOSIS — E55.9 VITAMIN D DEFICIENCY: ICD-10-CM

## 2025-07-18 DIAGNOSIS — M47.816 LUMBAR SPONDYLOSIS: ICD-10-CM

## 2025-07-18 DIAGNOSIS — Z79.899 MEDICATION MANAGEMENT: ICD-10-CM

## 2025-07-18 DIAGNOSIS — M19.90 ARTHRITIS: ICD-10-CM

## 2025-07-18 DIAGNOSIS — Z12.31 ENCOUNTER FOR SCREENING MAMMOGRAM FOR BREAST CANCER: ICD-10-CM

## 2025-07-18 DIAGNOSIS — Z00.00 ROUTINE ADULT HEALTH MAINTENANCE: Primary | ICD-10-CM

## 2025-07-18 DIAGNOSIS — Z13.89 SCREENING FOR MULTIPLE CONDITIONS: ICD-10-CM

## 2025-07-18 DIAGNOSIS — M81.0 AGE-RELATED OSTEOPOROSIS WITHOUT CURRENT PATHOLOGICAL FRACTURE: ICD-10-CM

## 2025-07-18 PROBLEM — R41.9 COGNITIVE COMPLAINTS: Status: RESOLVED | Noted: 2023-06-23 | Resolved: 2025-07-18

## 2025-07-18 LAB
25(OH)D3 SERPL-MCNC: 59 NG/ML (ref 30–100)
CHOLEST SERPL-MCNC: 190 MG/DL (ref 0–199)
CHOLESTEROL/HDL RATIO: 2.9
HDLC SERPL-MCNC: 65.9 MG/DL
LDLC SERPL CALC-MCNC: 104 MG/DL
NON HDL CHOLESTEROL: 124 MG/DL (ref 0–149)
TRIGL SERPL-MCNC: 103 MG/DL (ref 0–149)
TSH SERPL-ACNC: 1.57 MIU/L (ref 0.44–3.98)
VIT B12 SERPL-MCNC: 202 PG/ML (ref 211–911)
VLDL: 21 MG/DL (ref 0–40)

## 2025-07-18 PROCEDURE — 1036F TOBACCO NON-USER: CPT | Performed by: INTERNAL MEDICINE

## 2025-07-18 PROCEDURE — 90677 PCV20 VACCINE IM: CPT | Performed by: INTERNAL MEDICINE

## 2025-07-18 PROCEDURE — 3078F DIAST BP <80 MM HG: CPT | Performed by: INTERNAL MEDICINE

## 2025-07-18 PROCEDURE — G0009 ADMIN PNEUMOCOCCAL VACCINE: HCPCS | Performed by: INTERNAL MEDICINE

## 2025-07-18 PROCEDURE — 99214 OFFICE O/P EST MOD 30 MIN: CPT | Performed by: INTERNAL MEDICINE

## 2025-07-18 PROCEDURE — 1160F RVW MEDS BY RX/DR IN RCRD: CPT | Performed by: INTERNAL MEDICINE

## 2025-07-18 PROCEDURE — 1126F AMNT PAIN NOTED NONE PRSNT: CPT | Performed by: INTERNAL MEDICINE

## 2025-07-18 PROCEDURE — 1159F MED LIST DOCD IN RCRD: CPT | Performed by: INTERNAL MEDICINE

## 2025-07-18 PROCEDURE — 3074F SYST BP LT 130 MM HG: CPT | Performed by: INTERNAL MEDICINE

## 2025-07-18 PROCEDURE — G0439 PPPS, SUBSEQ VISIT: HCPCS | Performed by: INTERNAL MEDICINE

## 2025-07-18 RX ORDER — AMLODIPINE AND BENAZEPRIL HYDROCHLORIDE 5; 40 MG/1; MG/1
1 CAPSULE ORAL DAILY
Qty: 100 CAPSULE | Refills: 3 | Status: SHIPPED | OUTPATIENT
Start: 2025-07-18

## 2025-07-18 RX ORDER — GABAPENTIN 100 MG/1
100 CAPSULE ORAL 3 TIMES DAILY
Qty: 270 CAPSULE | Refills: 3 | Status: SHIPPED | OUTPATIENT
Start: 2025-07-18 | End: 2026-07-18

## 2025-07-18 RX ORDER — NITROGLYCERIN 0.4 MG/1
0.4 TABLET SUBLINGUAL EVERY 5 MIN PRN
Qty: 90 TABLET | Refills: 1 | Status: SHIPPED | OUTPATIENT
Start: 2025-07-18

## 2025-07-18 RX ORDER — ATORVASTATIN CALCIUM 80 MG/1
80 TABLET, FILM COATED ORAL DAILY
Qty: 90 TABLET | Refills: 3 | Status: SHIPPED | OUTPATIENT
Start: 2025-07-18

## 2025-07-18 RX ORDER — ZOLPIDEM TARTRATE 10 MG/1
10 TABLET ORAL NIGHTLY PRN
Qty: 90 TABLET | Refills: 0 | Status: SHIPPED | OUTPATIENT
Start: 2025-07-18 | End: 2025-10-16

## 2025-07-18 RX ORDER — HYDROCODONE BITARTRATE AND ACETAMINOPHEN 5; 325 MG/1; MG/1
1 TABLET ORAL EVERY 8 HOURS PRN
Qty: 90 TABLET | Refills: 0 | Status: SHIPPED | OUTPATIENT
Start: 2025-07-18

## 2025-07-18 RX ORDER — ASPIRIN 325 MG
1.25 TABLET, DELAYED RELEASE (ENTERIC COATED) ORAL
Qty: 12 CAPSULE | Refills: 3 | Status: SHIPPED | OUTPATIENT
Start: 2025-07-20

## 2025-07-18 SDOH — ECONOMIC STABILITY: INCOME INSECURITY: IN THE LAST 12 MONTHS, WAS THERE A TIME WHEN YOU WERE NOT ABLE TO PAY THE MORTGAGE OR RENT ON TIME?: NO

## 2025-07-18 SDOH — ECONOMIC STABILITY: FOOD INSECURITY: WITHIN THE PAST 12 MONTHS, THE FOOD YOU BOUGHT JUST DIDN'T LAST AND YOU DIDN'T HAVE MONEY TO GET MORE.: NEVER TRUE

## 2025-07-18 SDOH — ECONOMIC STABILITY: FOOD INSECURITY: WITHIN THE PAST 12 MONTHS, YOU WORRIED THAT YOUR FOOD WOULD RUN OUT BEFORE YOU GOT MONEY TO BUY MORE.: NEVER TRUE

## 2025-07-18 ASSESSMENT — MINI MENTAL STATE EXAM
PLACE DESIGN, ERASER AND PENCIL IN FRONT OF THE PERSON.  SAY:  COPY THIS DESIGN PLEASE.  SHOW: DESIGN. ALLOW: MULTIPLE TRIES. WAIT UNTIL PERSON IS FINISHED AND HANDS IT BACK. SCORE: ONLY FOR DIAGRAM WITH 4-SIDED FIGURE BETWEEN TWO 5-SIDED FIGURES: 0 CORRECT
SUM ALL MMSE QUESTIONS FOR TOTAL SCORE [OUT OF 30].: 27
HAND THE PERSON A PENCIL AND PAPER. SAY:  WRITE ANY COMPLETE SENTENCE ON THAT PIECE OF PAPER. (NOTE: THE SENTENCE MUST MAKE SENSE.  IGNORE SPELLING ERRORS): 1 CORRECT
PLEASE COPY THIS PICTURE (NOTE ALL 10 ANGLES MUST BE PRESENT AND TWO MUST INTERSECT): 1 CORRECT
SAY: I WOULD LIKE YOU TO REPEAT THIS PHRASE AFTER ME: NO IFS, ANDS, OR BUTS.: 1 CORRECT
WHAT STATE, COUNTRY, CITY, HOSPITAL, FLOOR: 5 CORRECT
SPELL THE WORD WORLD FORWARD AND BACKWARDS OR SERIAL 7S: 5 CORRECT
RECALL THE 3 OBJECTS FROM ABOVE (APPLE, TABLE, PENNY) OR (BALL, TREE, FLAG): 2 CORRECT
SAY:  READ THE WORDS ON THE PAGE AND THEN DO WHAT IT SAYS.  THEN HAND THE PERSON THE SHEET WITH CLOSE YOUR EYES ON IT.  IF THE SUBJECT READS AND DOES NOT CLOSE THEIR EYES, REPEAT UP TO THREE TIMES.  SCORE ONLY IF SUBJECT CLOSES EYES.: 3 CORRECT
NAME OR REPEAT 3 OBJECTS - (APPLE, TABLE, PENNY) OR (BALL, TREE, FLAG): 3 CORRECT
WHAT IS THE YEAR, SEASON, DATE, DAY, AND MONTH: 4 CORRECT
SHOW: PENCIL [OBJECT] ASK: WHAT IS THIS CALLED?: 2 CORRECT

## 2025-07-18 ASSESSMENT — SOCIAL DETERMINANTS OF HEALTH (SDOH): IN THE PAST 12 MONTHS, HAS THE ELECTRIC, GAS, OIL, OR WATER COMPANY THREATENED TO SHUT OFF SERVICE IN YOUR HOME?: NO

## 2025-07-18 ASSESSMENT — PAIN SCALES - GENERAL: PAINLEVEL_OUTOF10: 0-NO PAIN

## 2025-07-18 NOTE — PROGRESS NOTES
Annual Medicare Wellness Exam/Comprehensive Problem Focused Follow Up  HPI/CC  Chief Complaint   Patient presents with    Medicare Annual Wellness Visit Subsequent     Granddaughter, Bessie states patient is having some forgetfulness and some repeating of questions.      Reviewed chart for care received since last appointment.    Ambien increased last year  No AE  working  Gets 6-7 hours a night    Memory seems worse  Some for getting and repeating    Saw Podiatry 4/25 C(opied)  ASSESSMENT:  Neuritis right fourth toe  Ingrown toenails 1 through 5 bilaterally  Painful callus plantar aspect of left fifth metatarsal head area     TREATMENT/PLAN:  We discussed the burning sensation in your fourth toe on the right foot, which has a spacer implant from a prior hammer toe surgery:  - I suspect the burning sensation is due to irritation of the nerves on the top of the toe.   - I do not recommend surgically removing the implant due to the significant risk of disrupting scarred blood vessels, which could lead to complications such as loss of blood flow to the toe.   - To address the discomfort, I recommend trying a cortisone injection to break down scar tissue around the nerve. This injection may be painful, and results can vary. Some patients experience significant relief after one or more injections, while others may not respond. If you choose to proceed, we can try up to three injections to assess effectiveness.   - Another option is to try silicone toe sleeves, which can be purchased on [employer]. These sleeves may help reduce irritation by cushioning the toe.   - Ingrowing toenails 1 through 5 on both feet debrided with electric bur nail nipper.  Painful callus over the left fifth metatarsal head area reduced with a #15 blade.  Please let me know how you would like to proceed with treatment. If the burning sensation worsens or becomes unmanageable, contact our office.     Saw Cardiology [facility] 1/25  (Copied)  IMPRESSION/PLAN:  1.) Coronary artery disease:                - H/o PCI/ARTEMIO x 2 to LAD, known  of RCA (2011).                - ECG today demonstrating NSR, no acute injury/ischemia.               - Normotensive, well appearing and without evidence of volume overload               on physical exam.                - Denies CP or any other symptoms concerning of anginal equivalent.                - Labs updated at PCP office 10/2024, LDL 80 mg/dL, appears to be               right around where she has been on prior labs.               - Discussed the following with the patient: risk factor stratification/lifestyle               Modification to minimize risk of future coronary event/progression of   known coronary dx - monitors her diet, strictly compliant w/ all   medications, tries to remain active, BP & cholesterol at goal.  Plan:  - No changes to therapy.  - Antiplatelet: asa 81 mg daily.  - Statin: atorvastatin 80 mg daily.  - Combination BP: amlodipine-Benazepril 5-40 mg daily.                - Follow up in 6 months.   Assessment and Plan:  Problem List Items Addressed This Visit          High    Routine adult health maintenance - Primary    Overview   Influenza Seasonal - High Dose - Age 65+ 10/1/19, 9/5/20, 10/1/21, 10/13/22, 9/2/23, 9/25/24  Moderna COVID 19 vaccine 3/17/21, 4/14/21, 11/7/21, 7/18/22, 10/11/23, 9/25/24  Pneumococcal-13 Vac Conjugate 5/5/2015   Pneumovax 8/18/2009, 6/23/20   Shingrix 9/25/20, 12/1/20  Tetanus Diphtheria Booster (Age >7) Pres Free 5/9/2016   [id number]/30/2008  Tdap (Age 7+)3/24/2006  Cologuard 3/5/18 (-); 3/2021 (-); 4/1/24 (-)  BMD 6/16; 7/20, 4/28/22, 7/23/24  Mamm 12/17, 2/3/20, 3/12/21, 6/1/23 6/30/22: Current 10-Year ASCVD Risk:  32.58% - High Risk  6/23: MSME 28/30 ; 7/18/25: 27/30         Current Assessment & Plan   Annual Wellness exam completed   Preventive Health History reviewed  Ordered:   Labs    Mammogram   PCV today  TDAP at pharmacy             Medium    Advanced directives, counseling/discussion    Overview   07/18/25: Son Amish Luong is her HCPOA  DNR Cardiac Arrest           Relevant Orders    DNR (Completed)    Age-related osteoporosis without current pathological fracture    Overview   Prolia started 10/12  BMD 4/28/22: T score -5.0 (forearm radius)  Last injection 1/10/25         Current Assessment & Plan   Prolia due in 13 days  Okfor Prevnar 20 today         Relevant Medications    cholecalciferol (Vitamin D-3) 1.25 mg (50,000 units) capsule (Start on 7/20/2025)    Arthritis    Overview   Uses Vicodin and Gabapentin prn hip OA  Also has foot/toe pain: s/p right 4th proximal interphalangeal joint arthroplasty  Declines surgical intervention  Tolerates w/o adverse effects  Stable  Continue         Relevant Medications    gabapentin (Neurontin) 100 mg capsule    HYDROcodone-acetaminophen (Norco) 5-325 mg tablet    Cervical disc disorder    Overview   Narrowing of the C4-C5, C5-C6, and C6-C7 disc spaces  On SNRI for neuropathic pain n past  On Vicodin  symptoms well managed, monitor         Relevant Medications    HYDROcodone-acetaminophen (Norco) 5-325 mg tablet    Chronic insomnia    Overview   On Ambien (and also on Gabapentin)  Tolerates well, no AE         Relevant Medications    zolpidem (Ambien) 10 mg tablet    Coronary artery disease involving native coronary artery of native heart without angina pectoris    Overview   Regional abnormalities in the RCA territory on ECHO;   LV function is normal with severe hypokinesis of inferior wall  S/p LAD stents x2.   Chronically occluded RCA that fills via left-to-right collaterals.   Occluded major diagonal, which also fills via left-to-left collaterals.  PLavix stopped 9/22  Sees [facility]  Medically managed with statin, ASA           Relevant Medications    amLODIPine-benazepriL (Lotrel) 5-40 mg capsule    atorvastatin (Lipitor) 80 mg tablet    cholecalciferol (Vitamin D-3) 1.25 mg (50,000  units) capsule (Start on 7/20/2025)    nitroglycerin (Nitrostat) 0.4 mg SL tablet    Encounter for screening mammogram for breast cancer    Relevant Orders    BI mammo bilateral screening tomosynthesis    Essential hypertension    Overview   Goal <130/80  controlled with CCB/ACEi (Lorel)  9/27/22 Cuff Comparison  Home cuff - 155/73 HR 63  Office cuff - 152/72 HR 61  Manual cuff - 152/76;         Relevant Medications    amLODIPine-benazepriL (Lotrel) 5-40 mg capsule    nitroglycerin (Nitrostat) 0.4 mg SL tablet    Other Relevant Orders    Albumin-Creatinine Ratio, Urine Random    Comprehensive Metabolic Panel    CBC and Auto Differential    Lipid Panel    Urinalysis with Reflex Microscopic    Albumin-Creatinine Ratio, Urine Random    Albumin-Creatinine Ratio, Urine Random    Lumbar spondylosis    Overview   Sees Ortho  Uses opiate and gabapentin prn  H/O XRAY Lumbar on 5/8/2025         Relevant Medications    gabapentin (Neurontin) 100 mg capsule    Medication management    Overview   CSA, UDS, OARRS, CSV [facility] compliant            Relevant Orders    Opiate/Opioid/Benzo Prescription Compliance    Vitamin B12    Mixed hyperlipidemia    Overview   Goal LDL <100.  On statin & fish oil         Relevant Medications    atorvastatin (Lipitor) 80 mg tablet    cholecalciferol (Vitamin D-3) 1.25 mg (50,000 units) capsule (Start on 7/20/2025)    Other Relevant Orders    CBC and Auto Differential    TSH with reflex to Free T4 if abnormal    TSH with reflex to Free T4 if abnormal    CBC and Auto Differential    Lipid Panel (Completed)    TSH with reflex to Free T4 if abnormal    Lipid Panel    Screening for multiple conditions    Overview   07/18/25:Depression screening completed using the PHQ-2 questions with results documented in the chart/encounter  (See Rooming Screenings for documentation)             Vitamin D deficiency    Overview   12/2020 = 32.4  Goal 30-40 (hx kidney stones)  On 22411 weekly          Relevant Medications  "   cholecalciferol (Vitamin D-3) 1.25 mg (50,000 units) capsule (Start on 7/20/2025)    Other Relevant Orders    Vitamin D 25-Hydroxy,Total (for eval of Vitamin D levels)    Vitamin B12    Vitamin D 25-Hydroxy,Total (for eval of Vitamin D levels)    Vitamin D 25-Hydroxy,Total (for eval of Vitamin D levels)     Other Visit Diagnoses         Need for Tdap vaccination        Relevant Medications    diphth,pertus,acell,,tetanus (BoostRIX) 2.5-8-5 Lf-mcg-Lf/0.5mL injection              ROS otherwise negative aside from what was mentioned above in HPI.    Vitals  /69   Pulse 58   Temp 36.9 °C (98.4 °F)   Ht 1.607 m (5' 3.25\")   Wt 58.1 kg (128 lb)   SpO2 100%   BMI 22.50 kg/m²   Body mass index is 22.5 kg/m².  Physical Exam  Gen: Alert, NAD  HEENT:  Unremarkable  Neck:  No BROOKE  Respiratory:  Lungs CTAB  Cardiovascular:  Heart RRR  Neuro:  Gross motor and sensory intact  Skin:  No suspicious lesions present  Breast: No masses, or axillary lymphadenopathy    MMSE: 27/30    Patient Care Team:  Thea Bach MD as PCP - General  Thea Bach MD as PCP - [employer] PCP  Albert Valentino Chan, MD as Referring Physician (Cardiology)  Anabela Kinney DPM as Referring Physician (Podiatry)       Health Risk Assessment:  Patient was asked if he/she has any difficulty performing the following activities of daily living:  Preparing nutritious food and eating? No  Grocery shopping? No  Bathing and grooming yourself? No  Getting dressed? No  Using the toilet?No  Using the phone? No  Moving around from place to place (physical ambulation)? No  Doing housework (including laundry) independently? No  Managing finances independently? No  Managing medications independently? No  Doing housework (including laundry) independently? No    Patient was asked if he/she:  Feels safe in current home environment?: Yes  Uses seatbelt? Yes  Sees the dentist regularly? Yes  Exercises regularly: No  Suffers from " depression, stress, anger, loneliness or social isolation, pain, suicidality? No    Dietary issues discussed: Yes  Cognitive Impairment No  Hearing difficulties: No  Visual Acuity assessed: No    What is your self-assessment of overall health status and life satisfaction? Good     5 minutes spent on SDOH screening:   Specifically Housing, Food Insecurity, Utilities and Transportatin Needs were evaluated   (See Screenings in Rooming section for documentation)  Food Insecurity: No Food Insecurity (7/18/2025)    Hunger Vital Sign     Worried About Running Out of Food in the Last Year: Never true     Ran Out of Food in the Last Year: Never true     Housing Stability: Unknown (7/18/2025)    Housing Stability Vital Sign     Unable to Pay for Housing in the Last Year: No     Number of Times Moved in the Last Year: Not on file     Homeless in the Last Year: No     Transportation Needs: No Transportation Needs (7/18/2025)    PRAPARE - Transportation     Lack of Transportation (Medical): No     Lack of Transportation (Non-Medical): No         Allergies and Medications  Carbamazepine, Morphine, Propoxyphene, and Niacin  Current Outpatient Medications   Medication Instructions    amLODIPine-benazepriL (Lotrel) 5-40 mg capsule 1 capsule, oral, Daily    amoxicillin (Amoxil) 500 mg capsule 4 CAPS (1) HOUR PRIOR TO APPT    ascorbic acid (VITAMIN C) 500 mg, oral, Daily    aspirin 81 mg, Daily    atorvastatin (LIPITOR) 80 mg, oral, Daily    [START ON 7/20/2025] cholecalciferol (VITAMIN D-3) 1.25 mg, oral, Once Weekly    cyanocobalamin (VITAMIN B-12) 1,000 mcg, Daily    diphth,pertus,acell,,tetanus (BoostRIX) 2.5-8-5 Lf-mcg-Lf/0.5mL injection 0.5 mL, intramuscular, Once    gabapentin (NEURONTIN) 100 mg, oral, 3 times daily    HYDROcodone-acetaminophen (Norco) 5-325 mg tablet 1 tablet, oral, Every 8 hours PRN    multivitamin-min-iron-FA-vit K (Adults Multivitamin) 18 mg iron-400 mcg-25 mcg tablet 1 tablet, Daily    naloxone (NARCAN) 4  mg, As needed    nitroglycerin (NITROSTAT) 0.4 mg, sublingual, Every 5 min PRN    omega-3 (Fish Oil) 60- mg capsule 500 mg, Daily    Prolia 60 mg, Every 6 months    zolpidem (AMBIEN) 10 mg, oral, Nightly PRN       Medications and Supplements  prescribed by me and other practitioners or clinical pharmacist (such as prescriptions, OTC's, herbal therapies and supplements) were reviewed and documented in the medical record.      Active Problem List  Problem List[1]    Comprehensive Medical/Surgical/Social/Family History  Medical History[2]  Surgical History[3]  Social History     Social History Narrative    Social History:    , living with son    Retired    Nonsmoker    Social ETOH: 2/week    ---    Family History:    pt adopted    Granddaughter: Neuropsychologist         Tobacco/Alcohol/Opioid use, as well as Illicit Drug Use was screened for/reviewed and documented in Social Documentation section of the chart and medication list as appropriate     Depression Screening  Depression screening completed using the PHQ-2 questions, with results documented in the chart/encounter (5 min spent on this).  Patient Health Questionnaire-2 Score: 0 (7/18/2025  2:47 PM)  (See also Rooming/Screening section for documentation, and/or progress note for additional information)      Advance Directives Discussion  Advanced Care Planning (including a Living Will, Healthcare POA, as well as specific end of life choices and/or directives), was discussed with the patient and/or surrogate, voluntarily, and details of that discussion documented in the Problem List (under Advanced Directives Discussion) of the medical record.   (16 min spent discussing above)     During the course of the visit the patient was educated and counseled about age appropriate screening and preventive services.   Completed preventive screenings were documented in the chart (see Routine Health Maintenance in Problem List) and orders were placed for  outstanding screenings/procedures as documented in the Assessment and Plan.    Patient Instructions (the written plan) was given to the patient at check out as part of the AVS.    Problem List Items Addressed This Visit          High    Routine adult health maintenance - Primary    Overview   Influenza Seasonal - High Dose - Age 65+ 10/1/19, 9/5/20, 10/1/21, 10/13/22, 9/2/23, 9/25/24  Moderna COVID 19 vaccine 3/17/21, 4/14/21, 11/7/21, 7/18/22, 10/11/23, 9/25/24  Pneumococcal-13 Vac Conjugate 5/5/2015   Pneumovax 8/18/2009, 6/23/20   Shingrix 9/25/20, 12/1/20  Tetanus Diphtheria Booster (Age >7) Pres Free 5/9/2016   Umuejwik44/30/2008  Tdap (Age 7+)3/24/2006  Cologuard 3/5/18 (-); 3/2021 (-); 4/1/24 (-)  BMD 6/16; 7/20, 4/28/22, 7/23/24  Mamm 12/17, 2/3/20, 3/12/21, 6/1/23 6/30/22: Current 10-Year ASCVD Risk:  32.58% - High Risk  6/23: MSME 28/30 ; 7/18/25: 27/30         Current Assessment & Plan   Annual Wellness exam completed   Preventive Health History reviewed  Ordered:   Labs    Mammogram   PCV today  TDAP at pharmacy            Medium    Advanced directives, counseling/discussion    Overview   07/18/25: Son Amish Luong is her HCPOA  DNR Cardiac Arrest           Relevant Orders    DNR (Completed)    Age-related osteoporosis without current pathological fracture    Overview   Prolia started 10/12  BMD 4/28/22: T score -5.0 (forearm radius)  Last injection 1/10/25         Current Assessment & Plan   Prolia due in 13 days  Okfor Prevnar 20 today         Relevant Medications    cholecalciferol (Vitamin D-3) 1.25 mg (50,000 units) capsule (Start on 7/20/2025)    Arthritis    Overview   Uses Vicodin and Gabapentin prn hip OA  Also has foot/toe pain: s/p right 4th proximal interphalangeal joint arthroplasty  Declines surgical intervention  Tolerates w/o adverse effects  Stable  Continue         Relevant Medications    gabapentin (Neurontin) 100 mg capsule    HYDROcodone-acetaminophen (Norco) 5-325 mg tablet     Cervical disc disorder    Overview   Narrowing of the C4-C5, C5-C6, and C6-C7 disc spaces  On SNRI for neuropathic pain n past  On Vicodin  symptoms well managed, monitor         Relevant Medications    HYDROcodone-acetaminophen (Norco) 5-325 mg tablet    Chronic insomnia    Overview   On Ambien (and also on Gabapentin)  Tolerates well, no AE         Relevant Medications    zolpidem (Ambien) 10 mg tablet    Coronary artery disease involving native coronary artery of native heart without angina pectoris    Overview   Regional abnormalities in the RCA territory on ECHO;   LV function is normal with severe hypokinesis of inferior wall  S/p LAD stents x2.   Chronically occluded RCA that fills via left-to-right collaterals.   Occluded major diagonal, which also fills via left-to-left collaterals.  PLavix stopped 9/22  Sees [facility]  Medically managed with statin, ASA           Relevant Medications    amLODIPine-benazepriL (Lotrel) 5-40 mg capsule    atorvastatin (Lipitor) 80 mg tablet    cholecalciferol (Vitamin D-3) 1.25 mg (50,000 units) capsule (Start on 7/20/2025)    nitroglycerin (Nitrostat) 0.4 mg SL tablet    Encounter for screening mammogram for breast cancer    Relevant Orders    BI mammo bilateral screening tomosynthesis    Essential hypertension    Overview   Goal <130/80  controlled with CCB/ACEi (Lorel)  9/27/22 Cuff Comparison  Home cuff - 155/73 HR 63  Office cuff - 152/72 HR 61  Manual cuff - 152/76;         Relevant Medications    amLODIPine-benazepriL (Lotrel) 5-40 mg capsule    nitroglycerin (Nitrostat) 0.4 mg SL tablet    Other Relevant Orders    Albumin-Creatinine Ratio, Urine Random    Comprehensive Metabolic Panel    CBC and Auto Differential    Lipid Panel    Urinalysis with Reflex Microscopic    Albumin-Creatinine Ratio, Urine Random    Albumin-Creatinine Ratio, Urine Random    Lumbar spondylosis    Overview   Sees Ortho  Uses opiate and gabapentin prn  H/O XRAY Lumbar on 5/8/2025          Relevant Medications    gabapentin (Neurontin) 100 mg capsule    Medication management    Overview   CSA, UDS, OARRS, CSV [facility] compliant            Relevant Orders    Opiate/Opioid/Benzo Prescription Compliance    Vitamin B12    Mixed hyperlipidemia    Overview   Goal LDL <100.  On statin & fish oil         Relevant Medications    atorvastatin (Lipitor) 80 mg tablet    cholecalciferol (Vitamin D-3) 1.25 mg (50,000 units) capsule (Start on 7/20/2025)    Other Relevant Orders    CBC and Auto Differential    TSH with reflex to Free T4 if abnormal    TSH with reflex to Free T4 if abnormal    CBC and Auto Differential    Lipid Panel (Completed)    TSH with reflex to Free T4 if abnormal    Lipid Panel    Screening for multiple conditions    Overview   07/18/25:Depression screening completed using the PHQ-2 questions with results documented in the chart/encounter  (See Rooming Screenings for documentation)             Vitamin D deficiency    Overview   12/2020 = 32.4  Goal 30-40 (hx kidney stones)  On 35164 weekly          Relevant Medications    cholecalciferol (Vitamin D-3) 1.25 mg (50,000 units) capsule (Start on 7/20/2025)    Other Relevant Orders    Vitamin D 25-Hydroxy,Total (for eval of Vitamin D levels)    Vitamin B12    Vitamin D 25-Hydroxy,Total (for eval of Vitamin D levels)    Vitamin D 25-Hydroxy,Total (for eval of Vitamin D levels)     Other Visit Diagnoses         Need for Tdap vaccination        Relevant Medications    diphth,pertus,acell,,tetanus (BoostRIX) 2.5-8-5 Lf-mcg-Lf/0.5mL injection             Controlled Substance Visit:  I have personally reviewed the OARRS report and have considered the risks of abuse, dependence, addiction and diversion and I believe that it is clinically appropriate for the patient to be prescribed this medication.    Is the patient prescribed a combination of a benzodiazepine and opioid?  No    Last Urine Drug Screen / ordered today: No  Recent Results (from the past 2978  hours)   Confirmation Opiate/Opioid/Benzo Prescription Compliance    Collection Time: 01/10/25 10:28 AM   Result Value Ref Range    Clonazepam <25 <25 ng/mL    7-Aminoclonazepam <25 <25 ng/mL    Alprazolam <25 <25 ng/mL    Alpha-Hydroxyalprazolam <25 <25 ng/mL    Midazolam <25 <25 ng/mL    Alpha-Hydroxymidazolam <25 <25 ng/mL    Chlordiazepoxide <25 <25 ng/mL    Diazepam <25 <25 ng/mL    Nordiazepam <25 <25 ng/mL    Temazepam <25 <25 ng/mL    Oxazepam <25 <25 ng/mL    Lorazepam <25 <25 ng/mL    Methadone <25 <25 ng/mL    EDDP <25 <25 ng/mL    6-Acetylmorphine <25 <25 ng/mL    Codeine <50 <50 ng/mL    Hydrocodone 365 (H) <25 ng/mL    Hydromorphone 111 (H) <25 ng/mL    Morphine  <50 <50 ng/mL    Norhydrocodone 845 (H) <25 ng/mL    Noroxycodone <25 <25 ng/mL    Oxycodone <25 <25 ng/mL    Oxymorphone <25 <25 ng/mL    Fentanyl <2.5 <2.5 ng/mL    Norfentanyl <2.5 <2.5 ng/mL    Tramadol <50 <50 ng/mL    O-Desmethyltramadol <50 <50 ng/mL    Zolpidem <25 <25 ng/mL    Zolpidem Metabolite (ZCA) >1,000 (H) <25 ng/mL   Screen Opiate/Opioid/Benzo Prescription Compliance    Collection Time: 01/10/25 10:28 AM   Result Value Ref Range    Creatinine, Urine Random 103.4 20.0 - 320.0 mg/dL    Amphetamine Screen, Urine Presumptive Negative Presumptive Negative    Barbiturate Screen, Urine Presumptive Negative Presumptive Negative    Cannabinoid Screen, Urine Presumptive Negative Presumptive Negative    Cocaine Metabolite Screen, Urine Presumptive Negative Presumptive Negative    PCP Screen, Urine Presumptive Negative Presumptive Negative     Results are as expected.     Controlled Substance Agreement:  Date of the Last Agreement: 1/10/2025  I have reviewed each line item on the Controlled Substance Agreement including, but not limited to, the benefits, risks, and alternatives to treatment with a Controlled Substance medication(s). The patient has verbalized understanding and signed the agreement.    Opioids:  What is the patient's goal  of therapy? To help hip pain  Is this being achieved with current treatment? yes    I have calculated the patient's Morphine Dose Equivalent (MED):   I have considered referral to Pain Management and/or a specialist, and do not feel it is necessary at this time.    I feel that it is clinically indicated to continue this current medication regimen after consideration of alternative therapies, and other non-opioid treatment.    Opioid Risk Screening:  Family History of Substance Abuse  Alcohol: 0 (1/10/2025 10:00 AM)  Illegal Dru (1/10/2025 10:00 AM)  Prescription Dru (1/10/2025 10:00 AM)    Personal History of Substance Abuse  Alcohol: 0 (1/10/2025 10:00 AM)  Illegal Drugs: 0 (1/10/2025 10:00 AM)  Prescription Drugs: 0 (1/10/2025 10:00 AM)    Patient Age (16-45)  Age (16-45): 0 (1/10/2025 10:00 AM)    History of Preadolescent Sexual Abuse  History of Preadolescent Sexual Abuse: 0 (1/10/2025 10:00 AM)    Psychological Disease  Attention Deficit Disorder, Obsessive Compulsive Disorder, Bipolar, Schizophrenia: 0 (1/10/2025 10:00 AM)  Depression: 0 (1/10/2025 10:00 AM)    Total Score  Total Score: 0 (1/10/2025 10:00 AM)        Pain Assessment:  Analgesia  What was your pain level on average during the past week?: 6  What was your pain level at its worst during the past week?: 9  What percentage of your pain has been relieved during the past week?: 90 %  Is the amount of pain relief you are now obtaining from your current pain relievers enough to make a real difference in your life?: Y  Query to Clinician: Is the patient's pain relief clinically significant?: Yes    Activities of Daily Living  Physical Functioning: Better  Family Relationships: Same  Social Relationships: Same  Mood: Same  Sleep Patterns: Same  Overall Functioning: Same    Adverse Events  Is patient experiencing any side effects from current pain relievers?: Y  Constipation: Mild                     [1]   Patient Active Problem List  Diagnosis     Age-related osteoporosis without current pathological fracture    Arthritis    Cervical disc disorder    Chronic insomnia    Coronary artery disease involving native coronary artery of native heart without angina pectoris    Diastolic dysfunction, left ventricle    Dilation of aorta    Hyperhidrosis    Mixed hyperlipidemia    Essential hypertension    Kidney stones    Left ventricular hypertrophy    Long term (current) use of opiate analgesic    Stenosis of carotid artery    Vitamin B12 deficiency    Vitamin D deficiency    Medication management    Routine adult health maintenance    Advanced directives, counseling/discussion    Screening for multiple conditions    Cardiac risk counseling    Disorder of bone density and structure, unspecified    Lumbar spondylosis    Symptomatic menopausal or female climacteric states    Screen for colon cancer    Encounter for screening mammogram for breast cancer    Encounter for monitoring denosumab therapy    Alcohol screening    Encounter for screening involving social determinants of health (SDoH)    Encounter for gynecological examination without abnormal finding   [2]   Past Medical History:  Diagnosis Date    Colon cancer screening 04/01/2024    COloguard (-)    H/O bone density study     4/28/22: T score -5.0 (forearm radius), spine normal 6/16: osteopenia; -3.5; -4.0 7/20 -3.4 forearm    H/O bone density study 07/22/2024    lowest T - 3.4 forearm radius    H/O cardiovascular stress test     6/22: CONCLUSIONS:  1. SPECT Perfusion Study: Normal.  2. There is no scintigraphic evidence for inducible ischemia.  3. No evidence of scarred myocardium.  4. Left ventricle is normal in size. The left ventricle systolic  function is normal.  5. Right ventricle is normal in size. The right ventricle systolic  function is normal.  6. This is a low risk scan.   LVEF % 69 -71    H/O cervical spine x-ray     2007; Narrowing of the C4-C5, C5-C6, and C6-C7 disc spaces    H/O CT scan of  abdomen     2006: Left UPJ or proximal ureteral calculus with associated mild hydronephrosis, punctate calculus right and left kidney; 3.6mm stone distal left ureter at UV junction, stone in right kidney unchanged    H/O CT scan of brain     2006 (-)    H/O Doppler ultrasound     11/16: Mild bilateral carotid bifurcation occlusive disease; 20-39% stenosis. 3/19: Compared to prior study of 6/14/2016, No significant change.    H/O echocardiogram     5/22: There is mild upper septal left  ventricular hypertrophy. Left ventricular systolic function is borderline normal.  EF = 54  5% (2D 4-ch.) Grade I left ventricular diastolic dysfunction.  - Basal inferior/inferoseptal severe hypokinesis/akinesis.  - The right ventricle is normal in size. Right ventricular systolic function is  normal.  - The left atrial cavity is moderately dilated.    H/O echocardiogram     (cont) - Aorta dilated at 4.0cm at Sinus (remainder not well visualized).  - Mild mitral (trivial-1+), tricuspid (trace), and pulmonic (trace-1+)  regurgitation.  - Mild (1+) aortic regurgitation.  - Estimated low/normal left and right atrial filling pressures.  6/15: Septal hypertrophy, Mild tricuspid regurgitation, normal PASP, DD; mild LVH, regional abnormalities in the RCA territory; LAE    H/O electrocardiogram     2007: NSR, frequent PVCs, rare PACs    H/O magnetic resonance imaging of brain and brain stem     2005 (-)    H/O magnetic resonance imaging of cervical spine     3/22: Multilevel cervical spondylosis with bulging disc, greatest at C4-5. There is mass effect upon the ventral surface of the spinal cord at C4-5 asymmetric to the left. There is bilateral multilevel neural foraminal narrowing of the cervical spine which is more pronounced at C3-4 on the right, C4-5 and C5-6 on the left, and C6-7 bilaterally.    H/O x-ray of lower extremity 03/31/2024    XR Toe: Status post right 4th proximal interphalangeal joint arthroplasty without evidence gross  complication    History of Holter monitoring     2007: NSR, frequent PVCs, rare PACs   [3]   Past Surgical History:  Procedure Laterality Date    CARDIAC CATHETERIZATION  08/15/2018    9/11: LV function is normal with severe hypokinesis of inferior wall, LAD stents x2. Chronically occluded RCA that fills via left-to-right collaterals. Occluded major diagonal, which also fills via left-to-left collaterals.    CHOLECYSTECTOMY  08/15/2018    Cholecystectomy    INCISIONAL BREAST BIOPSY  08/15/2018    Incisional Breast Biopsy    OTHER SURGICAL HISTORY  08/15/2018    Continuous ECG Holter Monitor 24 Hour    OTHER SURGICAL HISTORY  08/15/2018    Hammertoe Operation (Each Toe)    TOTAL HIP ARTHROPLASTY  08/15/2018    Hip Replacement

## 2025-07-18 NOTE — ASSESSMENT & PLAN NOTE
Annual Wellness exam completed   Preventive Health History reviewed  Ordered:   Labs    Mammogram   PCV today  TDAP at pharmacy

## 2025-07-21 DIAGNOSIS — E53.8 VITAMIN B12 DEFICIENCY: ICD-10-CM

## 2025-07-21 LAB
ALBUMIN/CREAT UR: 9 MG/G CREAT
CREAT UR-MCNC: 205 MG/DL (ref 20–275)
MICROALBUMIN UR-MCNC: 1.8 MG/DL

## 2025-07-22 ENCOUNTER — HOSPITAL ENCOUNTER (OUTPATIENT)
Dept: RADIOLOGY | Facility: CLINIC | Age: 80
Discharge: HOME | End: 2025-07-22
Payer: MEDICARE

## 2025-07-22 ENCOUNTER — OFFICE VISIT (OUTPATIENT)
Dept: PRIMARY CARE | Facility: CLINIC | Age: 80
End: 2025-07-22
Payer: MEDICARE

## 2025-07-22 VITALS
WEIGHT: 129 LBS | DIASTOLIC BLOOD PRESSURE: 72 MMHG | BODY MASS INDEX: 22.86 KG/M2 | HEART RATE: 58 BPM | SYSTOLIC BLOOD PRESSURE: 121 MMHG | TEMPERATURE: 98 F | OXYGEN SATURATION: 97 % | HEIGHT: 63 IN

## 2025-07-22 DIAGNOSIS — Z12.31 ENCOUNTER FOR SCREENING MAMMOGRAM FOR BREAST CANCER: ICD-10-CM

## 2025-07-22 DIAGNOSIS — E53.8 VITAMIN B12 DEFICIENCY: Primary | ICD-10-CM

## 2025-07-22 DIAGNOSIS — E53.8 VITAMIN B12 DEFICIENCY: ICD-10-CM

## 2025-07-22 PROCEDURE — 1036F TOBACCO NON-USER: CPT | Performed by: NURSE PRACTITIONER

## 2025-07-22 PROCEDURE — 77067 SCR MAMMO BI INCL CAD: CPT

## 2025-07-22 PROCEDURE — 1126F AMNT PAIN NOTED NONE PRSNT: CPT | Performed by: NURSE PRACTITIONER

## 2025-07-22 PROCEDURE — 1159F MED LIST DOCD IN RCRD: CPT | Performed by: NURSE PRACTITIONER

## 2025-07-22 PROCEDURE — G2211 COMPLEX E/M VISIT ADD ON: HCPCS | Performed by: NURSE PRACTITIONER

## 2025-07-22 PROCEDURE — 3078F DIAST BP <80 MM HG: CPT | Performed by: NURSE PRACTITIONER

## 2025-07-22 PROCEDURE — 96372 THER/PROPH/DIAG INJ SC/IM: CPT | Performed by: NURSE PRACTITIONER

## 2025-07-22 PROCEDURE — 3074F SYST BP LT 130 MM HG: CPT | Performed by: NURSE PRACTITIONER

## 2025-07-22 PROCEDURE — 99213 OFFICE O/P EST LOW 20 MIN: CPT | Performed by: NURSE PRACTITIONER

## 2025-07-22 PROCEDURE — 1160F RVW MEDS BY RX/DR IN RCRD: CPT | Performed by: NURSE PRACTITIONER

## 2025-07-22 RX ORDER — CYANOCOBALAMIN 1000 UG/ML
INJECTION, SOLUTION INTRAMUSCULAR; SUBCUTANEOUS
Qty: 15 ML | Refills: 1 | Status: SHIPPED | OUTPATIENT
Start: 2025-07-22

## 2025-07-22 RX ORDER — CYANOCOBALAMIN 1000 UG/ML
1000 INJECTION, SOLUTION INTRAMUSCULAR; SUBCUTANEOUS ONCE
Status: COMPLETED | OUTPATIENT
Start: 2025-07-22 | End: 2025-07-22

## 2025-07-22 RX ADMIN — CYANOCOBALAMIN 1000 MCG: 1000 INJECTION, SOLUTION INTRAMUSCULAR; SUBCUTANEOUS at 09:02

## 2025-07-22 ASSESSMENT — PAIN SCALES - GENERAL: PAINLEVEL_OUTOF10: 0-NO PAIN

## 2025-07-22 NOTE — PROGRESS NOTES
"Subjective   Patient ID: Meg Luong is a 80 y.o. female who presents for Follow-up (B12 injection and teach how to inject ).    B12 low  PCP rec'd injections  Here to be taught  Lives with grand daughter  Grand daughter will give injections  Subcutaneous         Review of Systems  ROS completely negative except what was mentioned in the HPI.  Problem List, surgical, social, and family histories which were reviewed and updated as necessary within the EMR. I also personally reviewed the notes, labs, and imaging that pertained to what was documented or discussed in the HPI.    Objective   Physical Exam  Vitals and nursing note reviewed.   Constitutional:       General: She is not in acute distress.     Appearance: Normal appearance.   HENT:      Head: Normocephalic and atraumatic.      Right Ear: External ear normal.      Left Ear: External ear normal.      Nose: Nose normal.      Mouth/Throat:      Mouth: Mucous membranes are moist.     Eyes:      Extraocular Movements: Extraocular movements intact.      Conjunctiva/sclera: Conjunctivae normal.      Pupils: Pupils are equal, round, and reactive to light.       Cardiovascular:      Rate and Rhythm: Normal rate and regular rhythm.      Heart sounds: Normal heart sounds.   Pulmonary:      Effort: Pulmonary effort is normal.      Breath sounds: Normal breath sounds.     Musculoskeletal:         General: Normal range of motion.      Cervical back: Normal range of motion and neck supple.     Skin:     General: Skin is warm and dry.     Neurological:      General: No focal deficit present.      Mental Status: She is alert and oriented to person, place, and time. Mental status is at baseline.     Psychiatric:         Mood and Affect: Mood normal.         Behavior: Behavior normal.         Thought Content: Thought content normal.         Judgment: Judgment normal.         /72   Pulse 58   Temp 36.7 °C (98 °F) (Temporal)   Ht 1.607 m (5' 3.25\")   Wt 58.5 kg (129 " lb)   SpO2 97%   BMI 22.67 kg/m²     Assessment/Plan    Problem List Items Addressed This Visit       Vitamin B12 deficiency - Primary    Overview   10/19 level was 260  12/2020 = 406  7/16/25 = 202         Current Assessment & Plan   Will do B12 injections daily for 1 week, weekly for 1 month, then monthly thereafter  Repeat b12 after 3 months just prior to an injection  B12 injection given today, and patient taught how to give self injections  Cyanocobalamin & syringes sent to her pharmacy         Relevant Medications    cyanocobalamin (Vitamin B-12) injection 1,000 mcg (Completed)

## 2025-07-22 NOTE — ASSESSMENT & PLAN NOTE
Will do B12 injections daily for 1 week, weekly for 1 month, then monthly thereafter  Repeat b12 after 3 months just prior to an injection  B12 injection given today, and patient taught how to give self injections  Cyanocobalamin & syringes sent to her pharmacy

## 2025-07-22 NOTE — PATIENT INSTRUCTIONS
Subcutaneous injection of B12 once a day for 7 days  Then once a week for 4 weeks  Then every 28-30 days there after  Recheck blood level at approx 3-4 months right BEFORE an injection

## 2025-07-23 LAB
25(OH)D3+25(OH)D2 SERPL-MCNC: 47 NG/ML (ref 30–100)
BASOPHILS # BLD AUTO: 41 CELLS/UL (ref 0–200)
BASOPHILS NFR BLD AUTO: 0.8 %
CHOLEST SERPL-MCNC: 178 MG/DL
CHOLEST/HDLC SERPL: 2.4 (CALC)
EOSINOPHIL # BLD AUTO: 71 CELLS/UL (ref 15–500)
EOSINOPHIL NFR BLD AUTO: 1.4 %
ERYTHROCYTE [DISTWIDTH] IN BLOOD BY AUTOMATED COUNT: 13.5 % (ref 11–15)
HCT VFR BLD AUTO: 37.6 % (ref 35–45)
HDLC SERPL-MCNC: 73 MG/DL
HGB BLD-MCNC: 12.1 G/DL (ref 11.7–15.5)
LDLC SERPL CALC-MCNC: 84 MG/DL (CALC)
LYMPHOCYTES # BLD AUTO: 1652 CELLS/UL (ref 850–3900)
LYMPHOCYTES NFR BLD AUTO: 32.4 %
MCH RBC QN AUTO: 30.8 PG (ref 27–33)
MCHC RBC AUTO-ENTMCNC: 32.2 G/DL (ref 32–36)
MCV RBC AUTO: 95.7 FL (ref 80–100)
MONOCYTES # BLD AUTO: 388 CELLS/UL (ref 200–950)
MONOCYTES NFR BLD AUTO: 7.6 %
NEUTROPHILS # BLD AUTO: 2948 CELLS/UL (ref 1500–7800)
NEUTROPHILS NFR BLD AUTO: 57.8 %
NONHDLC SERPL-MCNC: 105 MG/DL (CALC)
PLATELET # BLD AUTO: 219 THOUSAND/UL (ref 140–400)
PMV BLD REES-ECKER: 10.4 FL (ref 7.5–12.5)
RBC # BLD AUTO: 3.93 MILLION/UL (ref 3.8–5.1)
TRIGL SERPL-MCNC: 115 MG/DL
TSH SERPL-ACNC: 0.95 MIU/L (ref 0.4–4.5)
WBC # BLD AUTO: 5.1 THOUSAND/UL (ref 3.8–10.8)

## 2025-07-28 ENCOUNTER — HOSPITAL ENCOUNTER (OUTPATIENT)
Dept: RADIOLOGY | Facility: EXTERNAL LOCATION | Age: 80
Discharge: HOME | End: 2025-07-28
Payer: MEDICARE

## 2025-07-31 ENCOUNTER — APPOINTMENT (OUTPATIENT)
Dept: INFUSION THERAPY | Facility: CLINIC | Age: 80
End: 2025-07-31
Payer: MEDICARE

## 2025-07-31 VITALS
DIASTOLIC BLOOD PRESSURE: 55 MMHG | RESPIRATION RATE: 16 BRPM | OXYGEN SATURATION: 99 % | TEMPERATURE: 98 F | SYSTOLIC BLOOD PRESSURE: 105 MMHG | HEART RATE: 63 BPM

## 2025-07-31 DIAGNOSIS — M81.0 AGE-RELATED OSTEOPOROSIS WITHOUT CURRENT PATHOLOGICAL FRACTURE: ICD-10-CM

## 2025-07-31 PROCEDURE — 96372 THER/PROPH/DIAG INJ SC/IM: CPT | Performed by: REGISTERED NURSE

## 2025-07-31 RX ORDER — FAMOTIDINE 10 MG/ML
20 INJECTION, SOLUTION INTRAVENOUS ONCE AS NEEDED
OUTPATIENT
Start: 2025-12-30

## 2025-07-31 RX ORDER — ALBUTEROL SULFATE 0.83 MG/ML
3 SOLUTION RESPIRATORY (INHALATION) AS NEEDED
OUTPATIENT
Start: 2025-12-30

## 2025-07-31 RX ORDER — DIPHENHYDRAMINE HYDROCHLORIDE 50 MG/ML
50 INJECTION, SOLUTION INTRAMUSCULAR; INTRAVENOUS AS NEEDED
OUTPATIENT
Start: 2025-12-30

## 2025-07-31 RX ORDER — EPINEPHRINE 0.3 MG/.3ML
0.3 INJECTION SUBCUTANEOUS EVERY 5 MIN PRN
OUTPATIENT
Start: 2025-12-30

## 2025-07-31 ASSESSMENT — ENCOUNTER SYMPTOMS
LIGHT-HEADEDNESS: 0
WOUND: 0
WHEEZING: 0
PALPITATIONS: 0
LEG SWELLING: 0
COUGH: 0
DIZZINESS: 0
NUMBNESS: 0
EXTREMITY WEAKNESS: 0
SHORTNESS OF BREATH: 0

## 2025-07-31 ASSESSMENT — PAIN SCALES - GENERAL: PAINLEVEL_OUTOF10: 0-NO PAIN

## 2025-07-31 NOTE — PROGRESS NOTES
[facility]   Infusion Clinic Note   Date: 2025   Name: Meg Luong  : 1945   MRN: [id number]         Reason for Visit: OP Infusion (Patient is here for Prolia 60mg subcutaneous injection q 180 days.  )         Today: We administered denosumab.       Referring Provider: Thea Bach MD    Plan Provider: Thea Bach MD      For a Diagnosis of: Age-related osteoporosis without current pathological fracture       At today's visit patient accompanied by: Self      Today's Vitals:   Vitals:    25 1104   BP: 105/55   Pulse: 63   Resp: 16   Temp: 36.7 °C (98 °F)   TempSrc: Temporal   SpO2: 99%   PainSc: 0-No pain             Pre - Treatment Checklist:      - Previous reaction to current treatment: no      (Assess patient for the concerns below. Document provider notification as appropriate).  - Active or recent infection with/without current antibiotic use: no  - Recent or planned invasive dental work: no  - Recent or planned surgeries: no  - Recently received or plans to receive vaccinations: no  - Has treatment related toxicities: no  - Any chance may be pregnant:  n/a      Pain: 0   - Is the pain different from normal: n/a   - Is prescribing Doctor aware:  n/a      Labs: Reviewed       Fall Risk Screening: Tolbert Fall Risk  History of Falling, Immediate or Within 3 Months: No  Secondary Diagnosis: No  Ambulatory Aid: Walks without aid/bedrest/nurse assist  Intravenous Therapy/Heparin Lock: No  Gait/Transferring: Normal/bedrest/immobile  Mental Status: Oriented to own ability  Tolbert Fall Risk Score: 0            Review Of Systems:  Review of Systems   Respiratory:  Negative for cough, shortness of breath and wheezing.    Cardiovascular:  Negative for chest pain, leg swelling and palpitations.   Skin:  Negative for itching, rash and wound.   Neurological:  Negative for dizziness, extremity weakness, light-headedness and numbness.         Infusion Readiness:  - Assessment  "Concerns Related to Infusion: No  - Provider notified: no      New Patient Education:    N/A (returning patient for continuation of therapy. Ongoing education provided as needed.)        Treatment Conditions & Drug Specific Questions:    Denosumab  (PROLIA. XGEVA. STOBOCLO)    (Unless otherwise specified on patient specific therapy plan):     TREATMENT CONDITIONS:  Unless otherwise specified on patient specific therapy plan HOLD and notify provider prior to proceeding with today's injection if patients:  O Corrected or Serum Calcium LESS THAN 8.6 mg/dL  OR Ionized calcium less than 1.1 mmol/L or  less than 4.7 mg/dL (depending on resulting agency)  o Recent or planned invasive dental procedure (within 4 weeks)    Lab Results   Component Value Date    CALCIUM 9.2 07/16/2025      No results found for: \"CAION\"    Patient meets treatment conditions? Yes    DRUG SPECIFIC QUESTIONS:  Is the patient taking calcium and vitamin D? Yes  (Recommended)    Pt Instructed on following risks: (1) hypocalcemia, (2) osteonecrosis of the jaw, (3) atypical femoral fractures, (4) serious infections, and (5) dermatologic reactions?  Yes      REMINDER:  PREGNANCY CATEGORY X DRUG. OBTAIN NEGTATIVE PREGNANCY TEST PRIOR TO FIRST INFUSION FOR WOMEN OF CHILDBEARING ABILITY   REMS DRUG    Recommended Vitals/Observation:  Vitals: Obtain vitals prior to injection.  Observation: Patient may leave immediately following injection.        Weight Based Drug Calculations:    WEIGHT BASED DRUGS: NOT APPLICABLE / FLAT DOSE       Post Treatment: Patient tolerated treatment without issue and was discharged in no apparent distress.      Note Authored / Patient Cared for By: Bessie Arango RN        "

## 2025-07-31 NOTE — PATIENT INSTRUCTIONS
Today you received: Prolia 60mg subcutaneous injection q180 days.      For:    1. Age-related osteoporosis without current pathological fracture            Please read the  Medication Guide that was given to you and reviewed during todays visit.     (Tell all doctors including dentists that you are taking this medication)     Go to the emergency room or call 911 if:  -You have signs of allergic reaction:   o         Rash, hives, itching.   o         Swollen, blistered, peeling skin.   o         Swelling of face, lips, mouth, tongue or throat.   o         Tightness of chest, trouble breathing, swallowing or talking      Call your doctor:     - If IV / injection site gets red, warm, swollen, itchy or leaks fluid or pus.     (Leave dressing on your IV site for at least 2 hours and keep area clean and dry    - If you get sick or have symptoms of infection or are not feeling well for any reason.    (Wash your hands often, stay away from people who are sick)    - If you have side effects from your medication that do not go away or are bothersome.     (Refer to the teaching your nurse gave you for side effects to call your doctor about)     Common side effects may include:  stuffy nose, headache, feeling tired, muscle aches, upset stomach    - Before receiving any vaccines, Call the [facility] at [phone]     - You get sick, are on antibiotics, have had a recent vaccine, have surgery or dental work and your doctor wants your visit rescheduled.    - You need to cancel and reschedule your visit for any reason. Call at least 2 days before your visit if you need to cancel.     - Your insurance changes before your next visit.    (We will need to get approval from your new insurance. This can take up to two weeks.)     The [facility] is opened Monday thru Friday 8am-8pm and Saturday 8am-4:30pm. We are closed on holidays.     Voice mail will take your call if the center is closed. If you  leave a message please allow 24 hours for a call back during weekdays. If you leave a message on a weekend/holiday, we will call you back the next business day.

## 2026-08-14 ENCOUNTER — APPOINTMENT (OUTPATIENT)
Dept: PRIMARY CARE | Facility: CLINIC | Age: 81
End: 2026-08-14
Payer: MEDICARE